# Patient Record
Sex: MALE | Race: WHITE | NOT HISPANIC OR LATINO | Employment: FULL TIME | URBAN - METROPOLITAN AREA
[De-identification: names, ages, dates, MRNs, and addresses within clinical notes are randomized per-mention and may not be internally consistent; named-entity substitution may affect disease eponyms.]

---

## 2018-09-25 ENCOUNTER — OFFICE VISIT (OUTPATIENT)
Dept: INTERNAL MEDICINE CLINIC | Facility: CLINIC | Age: 38
End: 2018-09-25
Payer: COMMERCIAL

## 2018-09-25 VITALS
TEMPERATURE: 98.8 F | HEIGHT: 72 IN | BODY MASS INDEX: 34.21 KG/M2 | DIASTOLIC BLOOD PRESSURE: 90 MMHG | OXYGEN SATURATION: 99 % | HEART RATE: 99 BPM | SYSTOLIC BLOOD PRESSURE: 138 MMHG | WEIGHT: 252.6 LBS

## 2018-09-25 DIAGNOSIS — R03.0 ELEVATED BP WITHOUT DIAGNOSIS OF HYPERTENSION: Primary | ICD-10-CM

## 2018-09-25 DIAGNOSIS — E66.09 CLASS 1 OBESITY DUE TO EXCESS CALORIES WITHOUT SERIOUS COMORBIDITY WITH BODY MASS INDEX (BMI) OF 34.0 TO 34.9 IN ADULT: ICD-10-CM

## 2018-09-25 DIAGNOSIS — Z13.220 LIPID SCREENING: ICD-10-CM

## 2018-09-25 DIAGNOSIS — Z13.29 THYROID DISORDER SCREENING: ICD-10-CM

## 2018-09-25 PROBLEM — E66.811 CLASS 1 OBESITY DUE TO EXCESS CALORIES WITHOUT SERIOUS COMORBIDITY WITH BODY MASS INDEX (BMI) OF 34.0 TO 34.9 IN ADULT: Status: ACTIVE | Noted: 2018-09-25

## 2018-09-25 PROCEDURE — 99214 OFFICE O/P EST MOD 30 MIN: CPT | Performed by: NURSE PRACTITIONER

## 2018-09-25 NOTE — PROGRESS NOTES
Assessment/Plan:     Diagnoses and all orders for this visit:    Elevated BP without diagnosis of hypertension  -     CBC and differential; Future  -     Comprehensive metabolic panel; Future        -     Discussed with patient that if his blood pressure is elevated at the next appointment I would advise that we start him on a low-dose medication  He is agreeable to this plan  I also discussed with him that he needs to improve his diet and his weight which both will help him to lower his blood pressure  Thyroid disorder screening  -     TSH, 3rd generation; Future    Lipid screening  -     Lipid Panel with Direct LDL reflex; Future    Class 1 obesity due to excess calories without serious comorbidity with body mass index (BMI) of 34 0 to 34 9 in adult       I discussed at length with the patient  The need to improve his diet and start exercising  He has had elevated blood pressure readings in the past and elevated again today in the office  He also has a family history of diabetes  He has never had blood work checked in the past and therefore we will order blood work today and have him get this checked and follow up in 4 weeks to review  Advised him the 1st step in improving his diet could be to eliminate sugary drinks and start drinking water  There are significant lifestyle improvements that can be made with him  I educated him on many of them, but realistically expect him to only make a few changes before his next visit  Will follow closely  Subjective:      Patient ID: Tamanna Colorado is a 45 y o  male  HPI    Patient is here today to establish care with our office  He was previously following with Dr Arturo Del Rio [facility]  He has not been seen by an office in a few years  He will be losing his insurance in 1 year and he wanted to be seen  The patient denies any current medical conditions  His chart shows a history of kidney stones, hypertension and anxiety   He does admit to all of these  He states he passed his kidney stone a few years ago  Hypertension - he has been told on many occasions that he has high blood pressure  He has never been started on medication  He states he has never gone to a doctor to be put on medicine  He has a family history of HTN in his mother and father  He also has a family history of diabetes in his father  Anxiety - He reports this is situation but controlled  He states his diet is poor at home  He eats a lot of fast food, lunch meat, cheesesteaks, hot dogs, hamburgers every day throughout the week for lunch  He eats a lot of sweets like tasty cakes, cupcakes, etc  He drinks a large amount of regular soda, ice tea and lemonade  His only exercise is bowling twice a week  He works as an auto-  He has a severe fear of needles  He states he has never given blood in his life  He is due to see the eye doctor      The following portions of the patient's history were reviewed and updated as appropriate: allergies, current medications, past family history, past medical history, past social history, past surgical history and problem list     Review of Systems   Constitutional: Negative for activity change, appetite change, chills, diaphoresis, fatigue, fever and unexpected weight change  Eyes: Negative for pain and visual disturbance  Respiratory: Negative for cough, chest tightness, shortness of breath and wheezing  Cardiovascular: Negative for chest pain, palpitations and leg swelling  Gastrointestinal: Negative for abdominal distention, abdominal pain, blood in stool, constipation, diarrhea, nausea and vomiting  Endocrine: Negative for cold intolerance, heat intolerance, polydipsia, polyphagia and polyuria  Genitourinary: Negative for difficulty urinating and dysuria  Musculoskeletal: Negative for arthralgias and myalgias  Skin: Negative for rash and wound     Neurological: Negative for dizziness, syncope, light-headedness and headaches  Psychiatric/Behavioral: Negative for decreased concentration and sleep disturbance (intermittently)  The patient is nervous/anxious (controlled and situational)  Past Medical History:   Diagnosis Date    Kidney stone     Pneumonia        No current outpatient prescriptions on file  Allergies   Allergen Reactions    Penicillins Rash     Annotation - [id number]: [id number] he has been treated with Amoxicllin for sinusities with no problems  Social History   Past Surgical History:   Procedure Laterality Date    NO PAST SURGERIES      WISDOM TOOTH EXTRACTION      WRIST SURGERY       Family History   Problem Relation Age of Onset    Hypertension Mother     Diabetes Father     Hypertension Father     Rheumatic fever Father     Alcohol abuse Paternal Grandfather     Heart failure Paternal Grandfather     Substance Abuse Paternal Grandfather     Diabetes Family        Objective:  /90 (BP Location: Left arm, Patient Position: Sitting, Cuff Size: Large)   Pulse 99   Temp 98 8 °F (37 1 °C) (Oral)   Ht 6' (1 829 m)   Wt 115 kg (252 lb 9 6 oz)   SpO2 99%   BMI 34 26 kg/m²      Physical Exam   Constitutional: He is oriented to person, place, and time  He appears well-developed and well-nourished  No distress  Obese   HENT:   Head: Normocephalic and atraumatic  Right Ear: Tympanic membrane and external ear normal    Left Ear: Tympanic membrane and external ear normal    Nose: Nose normal    Mouth/Throat: Oropharynx is clear and moist  No oropharyngeal exudate, posterior oropharyngeal edema or posterior oropharyngeal erythema  Eyes: Conjunctivae and EOM are normal  Pupils are equal, round, and reactive to light  Neck: Normal range of motion  Neck supple  No thyromegaly present  Cardiovascular: Normal rate, regular rhythm and normal heart sounds  No murmur heard  Pulmonary/Chest: Effort normal and breath sounds normal  No respiratory distress   He has no decreased breath sounds  He has no wheezes  He has no rhonchi  Abdominal: Soft  Bowel sounds are normal  He exhibits no distension and no mass  There is no tenderness  Musculoskeletal: He exhibits no edema  Lymphadenopathy:     He has no cervical adenopathy  Neurological: He is alert and oriented to person, place, and time  Skin: Skin is warm and dry  He is not diaphoretic  Psychiatric: He has a normal mood and affect  His behavior is normal    Vitals reviewed

## 2018-11-06 ENCOUNTER — OFFICE VISIT (OUTPATIENT)
Dept: INTERNAL MEDICINE CLINIC | Facility: CLINIC | Age: 38
End: 2018-11-06
Payer: COMMERCIAL

## 2018-11-06 VITALS
SYSTOLIC BLOOD PRESSURE: 140 MMHG | HEART RATE: 88 BPM | TEMPERATURE: 98.1 F | BODY MASS INDEX: 33.83 KG/M2 | OXYGEN SATURATION: 99 % | WEIGHT: 249.8 LBS | HEIGHT: 72 IN | DIASTOLIC BLOOD PRESSURE: 98 MMHG

## 2018-11-06 DIAGNOSIS — E66.09 CLASS 1 OBESITY DUE TO EXCESS CALORIES WITHOUT SERIOUS COMORBIDITY WITH BODY MASS INDEX (BMI) OF 34.0 TO 34.9 IN ADULT: ICD-10-CM

## 2018-11-06 DIAGNOSIS — E78.00 HYPERCHOLESTEROLEMIA: ICD-10-CM

## 2018-11-06 DIAGNOSIS — R06.83 SNORING: ICD-10-CM

## 2018-11-06 DIAGNOSIS — I10 ESSENTIAL HYPERTENSION: Primary | ICD-10-CM

## 2018-11-06 PROCEDURE — 1036F TOBACCO NON-USER: CPT | Performed by: NURSE PRACTITIONER

## 2018-11-06 PROCEDURE — 3008F BODY MASS INDEX DOCD: CPT | Performed by: NURSE PRACTITIONER

## 2018-11-06 PROCEDURE — 99214 OFFICE O/P EST MOD 30 MIN: CPT | Performed by: NURSE PRACTITIONER

## 2018-11-06 RX ORDER — LOSARTAN POTASSIUM 50 MG/1
50 TABLET ORAL DAILY
Qty: 30 TABLET | Refills: 1 | Status: SHIPPED | OUTPATIENT
Start: 2018-11-06 | End: 2019-01-16 | Stop reason: SDUPTHER

## 2018-11-06 NOTE — PATIENT INSTRUCTIONS
Start losartan 50mg daily  Goal is less than 130/90  Follow up 1 month  Increase exercise  Improve diet!!!!

## 2018-11-06 NOTE — PROGRESS NOTES
Assessment/Plan:     Diagnoses and all orders for this visit:    Essential hypertension  -     Diagnostic Sleep Study; Future  -     losartan (COZAAR) 50 mg tablet; Take 1 tablet (50 mg total) by mouth daily  -     monitor blood pressure at home  And record in log patient our goal is less than 130/90  Follow-up 1 month    Class 1 obesity due to excess calories without serious comorbidity with body mass index (BMI) of 34 0 to 34 9 in adult  -     Diagnostic Sleep Study; Future  -     goal for at least 5 lb weight loss that one-month follow-up  Reviewed at length again with patient dietary improvements that he needs to make  Hypercholesterolemia        -     reviewed lipid panel with patient    Advised on dietary changes to improve this  Snoring  -     Diagnostic Sleep Study; Future  -     with patient's large neck circumference, crowded airway, obesity, and now hypertension will send for sleep study for further evaluation of obstructive sleep apnea              Subjective:      Patient ID: Isaiah Carroll is a 45 y o  male  HPI     Patient presents today for 1 month follow up and to review labs    Hypertension  Patient is here for follow-up of elevated blood pressure  He is not exercising but does bowl twice a week and is not adherent to a low-salt diet  He does notchecks his blood pressure at home  Blood pressure is not well controlled  Current cardiac symptoms: none  Patient denies chest pain, chest pressure/discomfort, dyspnea, exertional chest pressure/discomfort, fatigue, irregular heart beat, lower extremity edema, near-syncope, palpitations and syncope  Cardiovascular risk factors: dyslipidemia, hypertension, male gender, obesity (BMI >= 30 kg/m2) and sedentary lifestyle  History of target organ damage: none  He is currently not taking any medications  Hyperlipidemia  Isaiah Carroll is a 45 y o  male who presents for follow up of dyslipidemia   A repeat fasting lipid profile was done  Total cholesterol 219 ; Triglycerides 84 ; HDL 40 ;    Patient is not currently on any cholesterol lowering medication  No labs for comparison  Snoring  Patient's wife reports that he snores often  No witnessed apneas  TSH normal 2 87  Glucose normal 89    The following portions of the patient's history were reviewed and updated as appropriate: allergies, current medications, past family history, past medical history, past social history, past surgical history and problem list     Review of Systems   Constitutional: Negative for chills, fever and unexpected weight change  Eyes: Negative for visual disturbance  Respiratory: Negative for cough, chest tightness, shortness of breath and wheezing  Cardiovascular: Negative for chest pain and palpitations  Neurological: Negative for dizziness, syncope, light-headedness and headaches  Past Medical History:   Diagnosis Date    Class 1 obesity due to excess calories without serious comorbidity with body mass index (BMI) of 34 0 to 34 9 in adult 9/25/2018    Kidney stone     Pneumonia        No current outpatient prescriptions on file  Allergies   Allergen Reactions    Penicillins Rash     Annotation - [id number]: [id number] he has been treated with Amoxicllin for sinusities with no problems         Social History   Past Surgical History:   Procedure Laterality Date    NO PAST SURGERIES      WISDOM TOOTH EXTRACTION      WRIST SURGERY       Family History   Problem Relation Age of Onset    Hypertension Mother     Diabetes Father     Hypertension Father     Rheumatic fever Father     Alcohol abuse Paternal Grandfather     Heart failure Paternal Grandfather     Substance Abuse Paternal Grandfather     Diabetes Family        Objective:  /98 (BP Location: Left arm, Patient Position: Sitting, Cuff Size: Large)   Pulse 88   Temp 98 1 °F (36 7 °C) (Oral)   Ht 6' (1 829 m)   Wt 113 kg (249 lb 12 8 oz)   SpO2 99%   BMI 33 88 kg/m²      Physical Exam   Constitutional: He is oriented to person, place, and time  He appears well-developed and well-nourished  No distress  Obese   HENT:   Head: Normocephalic and atraumatic  Right Ear: External ear normal    Left Ear: External ear normal    Nose: Nose normal    Mouth/Throat: Oropharynx is clear and moist and mucous membranes are normal  No oropharyngeal exudate, posterior oropharyngeal edema or posterior oropharyngeal erythema  Crowded airway   Eyes: Pupils are equal, round, and reactive to light  Conjunctivae and EOM are normal    Neck: Normal range of motion  Neck supple  Carotid bruit is not present  No thyromegaly present  Large neck circumference  18 25 inches   Cardiovascular: Normal rate, regular rhythm and normal heart sounds  No murmur heard  Pulmonary/Chest: Effort normal and breath sounds normal  No respiratory distress  He has no decreased breath sounds  He has no wheezes  He has no rhonchi  Musculoskeletal: He exhibits no edema  Lymphadenopathy:     He has no cervical adenopathy  Neurological: He is alert and oriented to person, place, and time  Skin: Skin is warm and dry  He is not diaphoretic  Psychiatric: He has a normal mood and affect  His behavior is normal    Vitals reviewed

## 2018-12-11 ENCOUNTER — OFFICE VISIT (OUTPATIENT)
Dept: INTERNAL MEDICINE CLINIC | Facility: CLINIC | Age: 38
End: 2018-12-11
Payer: COMMERCIAL

## 2018-12-11 VITALS
DIASTOLIC BLOOD PRESSURE: 74 MMHG | HEIGHT: 73 IN | TEMPERATURE: 98.6 F | WEIGHT: 251 LBS | SYSTOLIC BLOOD PRESSURE: 128 MMHG | HEART RATE: 86 BPM | BODY MASS INDEX: 33.27 KG/M2 | OXYGEN SATURATION: 98 %

## 2018-12-11 DIAGNOSIS — E78.00 HYPERCHOLESTEROLEMIA: ICD-10-CM

## 2018-12-11 DIAGNOSIS — E66.09 CLASS 1 OBESITY DUE TO EXCESS CALORIES WITHOUT SERIOUS COMORBIDITY WITH BODY MASS INDEX (BMI) OF 34.0 TO 34.9 IN ADULT: ICD-10-CM

## 2018-12-11 DIAGNOSIS — I10 ESSENTIAL HYPERTENSION: Primary | ICD-10-CM

## 2018-12-11 DIAGNOSIS — R06.83 SNORING: ICD-10-CM

## 2018-12-11 PROBLEM — R03.0 ELEVATED BP WITHOUT DIAGNOSIS OF HYPERTENSION: Status: RESOLVED | Noted: 2018-09-25 | Resolved: 2018-12-11

## 2018-12-11 PROBLEM — Z13.220 LIPID SCREENING: Status: RESOLVED | Noted: 2018-09-25 | Resolved: 2018-12-11

## 2018-12-11 PROBLEM — Z13.29 THYROID DISORDER SCREENING: Status: RESOLVED | Noted: 2018-09-25 | Resolved: 2018-12-11

## 2018-12-11 PROCEDURE — 3008F BODY MASS INDEX DOCD: CPT | Performed by: NURSE PRACTITIONER

## 2018-12-11 PROCEDURE — 99213 OFFICE O/P EST LOW 20 MIN: CPT | Performed by: NURSE PRACTITIONER

## 2018-12-11 PROCEDURE — 1036F TOBACCO NON-USER: CPT | Performed by: NURSE PRACTITIONER

## 2018-12-11 PROCEDURE — 3078F DIAST BP <80 MM HG: CPT | Performed by: NURSE PRACTITIONER

## 2018-12-11 PROCEDURE — 3074F SYST BP LT 130 MM HG: CPT | Performed by: NURSE PRACTITIONER

## 2018-12-11 NOTE — PATIENT INSTRUCTIONS
Continue same medication  Continue to monitor BP at home  Improve diet  Start exercise  Aim for weight loss  Follow up in 3 months with labs before visit     Schedule sleep study

## 2018-12-11 NOTE — PROGRESS NOTES
Assessment/Plan:     Diagnoses and all orders for this visit:    Essential hypertension       -    blood pressure much improved on losartan 50 mg daily  Now at goal   Continue same medication and continue to monitor blood pressure at home  Follow-up in 3 months    Hypercholesterolemia  -     Lipid Panel with Direct LDL reflex; Future  -     again reviewed dietary changes with the patient  Advised him to follow up with new labs in 3 months to monitor cholesterol    Class 1 obesity due to excess calories without serious comorbidity with body mass index (BMI) of 34 0 to 34 9 in adult         -    reviewed importance of starting an exercise regimen  Also discussed dietary improvement    Snoring              -  advised patient to schedule sleep study    Follow-up 3 months        Subjective:      Patient ID: Tracy Cunningham is a 45 y o  male  HPI    Patient presents for 1 month follow up hypertension  He was started on losartan 50mg at the last visit  Hypertension  Patient is here for follow-up of elevated blood pressure  He is not exercising and is not adherent to a low-salt diet  He has been cutting back on soda  He does notchecks his blood pressure at home routinely  He has checked about twice and got 130/80  Blood pressure is well controlled  Current cardiac symptoms: none  Patient denies chest pain, chest pressure/discomfort, dyspnea, exertional chest pressure/discomfort, fatigue, irregular heart beat, lower extremity edema and palpitations  He is currently taking losartan 50mg daily He is compliant with medication use  Forgot about 3x in the last month  Patient has not gone for sleep study yet  He has cut back on soda  No exercise    Obesity  Patient has tried to cut back on soda  Otherwise he has not made any significant dietary changes  He also has not started an exercise regimen  He has gained 2 lb since last visit         The following portions of the patient's history were reviewed and updated as appropriate: allergies, current medications, past family history, past medical history, past social history, past surgical history and problem list     Review of Systems   Constitutional: Negative for chills, fatigue and fever  Eyes: Negative for visual disturbance  Respiratory: Negative for cough, chest tightness, shortness of breath and wheezing  Cardiovascular: Negative for chest pain, palpitations and leg swelling  Neurological: Positive for headaches  Negative for dizziness and light-headedness (occasional)  Psychiatric/Behavioral: Negative for sleep disturbance  Past Medical History:   Diagnosis Date    Class 1 obesity due to excess calories without serious comorbidity with body mass index (BMI) of 34 0 to 34 9 in adult 9/25/2018    Essential hypertension 11/6/2018    Kidney stone     Pneumonia          Current Outpatient Prescriptions:     losartan (COZAAR) 50 mg tablet, Take 1 tablet (50 mg total) by mouth daily, Disp: 30 tablet, Rfl: 1    Allergies   Allergen Reactions    Penicillins Rash     Annotation - [id number]: HOwever he has been treated with Amoxicllin for sinusities with no problems  Social History   Past Surgical History:   Procedure Laterality Date    NO PAST SURGERIES      WISDOM TOOTH EXTRACTION      WRIST SURGERY       Family History   Problem Relation Age of Onset    Hypertension Mother     Diabetes Father     Hypertension Father     Rheumatic fever Father     Alcohol abuse Paternal Grandfather     Heart failure Paternal Grandfather     Substance Abuse Paternal Grandfather     Diabetes Family        Objective:  /74 (BP Location: Left arm, Patient Position: Sitting, Cuff Size: Large)   Pulse 86   Temp 98 6 °F (37 °C) (Oral)   Ht 6' 0 5" (1 842 m)   Wt 114 kg (251 lb)   SpO2 98%   BMI 33 57 kg/m²      Physical Exam   Constitutional: He appears well-developed and well-nourished  No distress     HENT:   Head: Normocephalic and atraumatic  Eyes: Pupils are equal, round, and reactive to light  Conjunctivae and EOM are normal    Neck: Normal range of motion  Neck supple  Carotid bruit is not present  No thyromegaly present  Cardiovascular: Normal rate, regular rhythm and normal heart sounds  No murmur heard  Pulmonary/Chest: Effort normal and breath sounds normal  No respiratory distress  He has no wheezes  Musculoskeletal: He exhibits no edema  Lymphadenopathy:     He has no cervical adenopathy  Neurological: He is alert  Skin: Skin is warm and dry  He is not diaphoretic  Psychiatric: He has a normal mood and affect  His behavior is normal    Vitals reviewed

## 2019-01-16 DIAGNOSIS — I10 ESSENTIAL HYPERTENSION: ICD-10-CM

## 2019-01-16 RX ORDER — LOSARTAN POTASSIUM 50 MG/1
50 TABLET ORAL DAILY
Qty: 90 TABLET | Refills: 1 | Status: SHIPPED | OUTPATIENT
Start: 2019-01-16 | End: 2019-08-23 | Stop reason: SDUPTHER

## 2019-03-09 ENCOUNTER — APPOINTMENT (OUTPATIENT)
Dept: LAB | Age: 39
End: 2019-03-09
Payer: COMMERCIAL

## 2019-03-09 DIAGNOSIS — E78.00 HYPERCHOLESTEROLEMIA: ICD-10-CM

## 2019-03-09 LAB
CHOLEST SERPL-MCNC: 239 MG/DL (ref 50–200)
HDLC SERPL-MCNC: 36 MG/DL (ref 40–60)
LDLC SERPL CALC-MCNC: 182 MG/DL (ref 0–100)
TRIGL SERPL-MCNC: 105 MG/DL

## 2019-03-09 PROCEDURE — 80061 LIPID PANEL: CPT

## 2019-03-09 PROCEDURE — 36415 COLL VENOUS BLD VENIPUNCTURE: CPT

## 2019-03-14 ENCOUNTER — OFFICE VISIT (OUTPATIENT)
Dept: INTERNAL MEDICINE CLINIC | Facility: CLINIC | Age: 39
End: 2019-03-14
Payer: COMMERCIAL

## 2019-03-14 VITALS
SYSTOLIC BLOOD PRESSURE: 120 MMHG | DIASTOLIC BLOOD PRESSURE: 90 MMHG | OXYGEN SATURATION: 98 % | HEART RATE: 93 BPM | HEIGHT: 73 IN | BODY MASS INDEX: 33.5 KG/M2 | TEMPERATURE: 98.6 F | WEIGHT: 252.8 LBS

## 2019-03-14 DIAGNOSIS — R06.83 SNORING: ICD-10-CM

## 2019-03-14 DIAGNOSIS — E66.09 CLASS 1 OBESITY DUE TO EXCESS CALORIES WITHOUT SERIOUS COMORBIDITY WITH BODY MASS INDEX (BMI) OF 34.0 TO 34.9 IN ADULT: ICD-10-CM

## 2019-03-14 DIAGNOSIS — I10 ESSENTIAL HYPERTENSION: ICD-10-CM

## 2019-03-14 DIAGNOSIS — E78.00 HYPERCHOLESTEROLEMIA: ICD-10-CM

## 2019-03-14 PROCEDURE — 99214 OFFICE O/P EST MOD 30 MIN: CPT | Performed by: NURSE PRACTITIONER

## 2019-03-14 PROCEDURE — 1036F TOBACCO NON-USER: CPT | Performed by: NURSE PRACTITIONER

## 2019-03-14 PROCEDURE — 3008F BODY MASS INDEX DOCD: CPT | Performed by: NURSE PRACTITIONER

## 2019-03-14 RX ORDER — SIMVASTATIN 10 MG
10 TABLET ORAL
Qty: 30 TABLET | Refills: 5 | Status: SHIPPED | OUTPATIENT
Start: 2019-03-14 | End: 2020-02-24 | Stop reason: SDUPTHER

## 2019-03-14 NOTE — PROGRESS NOTES
Assessment/Plan:  BMI Counseling: Body mass index is 33 81 kg/m²  Discussed the patient's BMI with him  The BMI is above average  BMI counseling and education was provided to the patient  Nutrition recommendations include reducing portion sizes, decreasing overall calorie intake and reducing intake of cholesterol  Exercise recommendations include moderate aerobic physical activity for 150 minutes/week  Problem List Items Addressed This Visit        Cardiovascular and Mediastinum    Essential hypertension     Continue losartan 50 mg daily            Other    Class 1 obesity due to excess calories without serious comorbidity with body mass index (BMI) of 34 0 to 34 9 in adult    Hypercholesterolemia     Total cholesterol above goal at 239,   Discussed options with patient of starting low-dose statin verses implementing lifestyle improvements  Patient verbalized that he you will not change his lifestyle and would rather start a medication  Will start him on simvastatin 10 mg daily  Follow-up in 3 months         Relevant Medications    simvastatin (ZOCOR) 10 mg tablet    Other Relevant Orders    Lipid Panel with Direct LDL reflex    Comprehensive metabolic panel    CK (with reflex to MB)    Snoring          Patient still has order for sleep study which he does plan to get done eventually    Subjective:      Patient ID: Merlinda Rock is a 44 y o  male  HPI     Patient presents for 3 month follow up HTN, HLD, and obesity  Hypertension  Patient is here for follow-up of elevated blood pressure  He is not exercising but he does go bowling and is adherent to a low-salt diet  He does checks his blood pressure at home  Average 118/ mid 80s  Blood pressure is well controlled  Current cardiac symptoms: none  Patient denies chest pain, chest pressure/discomfort, dyspnea, exertional chest pressure/discomfort, irregular heart beat, lower extremity edema and palpitations   Cardiovascular risk factors: dyslipidemia, hypertension and obesity (BMI >= 30 kg/m2)  History of target organ damage: none  He is currently taking angiotensin II receptor antagonist, losartan 50mg  He is compliant with medication use  Hyperlipidemia  Bryn Houston is a 44 y o  male who presents for follow up of dyslipidemia  A repeat fasting lipid profile was done  Total cholesterol 239 ; Triglycerides 105 ; HDL 36 ;    Previous history of cardiac disease includes: none  Lipid abnormalities are worsening  The patient exercises infrequently  Ten year clinical ASCVD risk score 2 7%    Snoring  He has not gone for his sleep study  He is having trouble finding time to go for it  Obesity  Slowly gaining weight  He is not exercising - he does go bowling  He is aware that his diet could be improved    The following portions of the patient's history were reviewed and updated as appropriate: allergies, current medications, past family history, past medical history, past social history, past surgical history and problem list     Review of Systems   Constitutional: Negative for activity change, appetite change, chills, diaphoresis, fatigue, fever and unexpected weight change  Eyes: Negative for visual disturbance  Respiratory: Negative for cough, chest tightness, shortness of breath and wheezing  Cardiovascular: Negative for chest pain, palpitations and leg swelling  Gastrointestinal: Negative for abdominal distention, abdominal pain, diarrhea, nausea and vomiting  Neurological: Negative for dizziness, light-headedness and headaches  Psychiatric/Behavioral: Negative for sleep disturbance           Past Medical History:   Diagnosis Date    Class 1 obesity due to excess calories without serious comorbidity with body mass index (BMI) of 34 0 to 34 9 in adult 9/25/2018    Essential hypertension 11/6/2018    Kidney stone     Pneumonia          Current Outpatient Medications:     losartan (COZAAR) 50 mg tablet, Take 1 tablet (50 mg total) by mouth daily, Disp: 90 tablet, Rfl: 1    Allergies   Allergen Reactions    Penicillins Rash     Annotation - [id number]: HOwever he has been treated with Amoxicllin for sinusities with no problems  Social History   Past Surgical History:   Procedure Laterality Date    NO PAST SURGERIES      WISDOM TOOTH EXTRACTION      WRIST SURGERY       Family History   Problem Relation Age of Onset    Hypertension Mother     Diabetes Father     Hypertension Father     Rheumatic fever Father     Alcohol abuse Paternal Grandfather     Heart failure Paternal Grandfather     Substance Abuse Paternal Grandfather     Diabetes Family        Objective:  /90 (BP Location: Left arm, Patient Position: Sitting, Cuff Size: Standard)   Pulse 93   Temp 98 6 °F (37 °C) (Oral)   Ht 6' 0 5" (1 842 m)   Wt 115 kg (252 lb 12 8 oz)   SpO2 98%   BMI 33 81 kg/m²      Physical Exam   Constitutional: He is oriented to person, place, and time  He appears well-developed and well-nourished  No distress  Obese   HENT:   Head: Normocephalic and atraumatic  Right Ear: Tympanic membrane and external ear normal    Left Ear: Tympanic membrane and external ear normal    Nose: Nose normal    Mouth/Throat: Oropharynx is clear and moist  No oropharyngeal exudate, posterior oropharyngeal edema or posterior oropharyngeal erythema  Eyes: Pupils are equal, round, and reactive to light  Conjunctivae and EOM are normal    Neck: Normal range of motion  Neck supple  Carotid bruit is not present  Cardiovascular: Normal rate, regular rhythm and normal heart sounds  No murmur heard  Pulmonary/Chest: Effort normal and breath sounds normal  No respiratory distress  He has no decreased breath sounds  He has no wheezes  He has no rhonchi  Musculoskeletal: He exhibits no edema  Lymphadenopathy:     He has no cervical adenopathy  Neurological: He is alert and oriented to person, place, and time     Skin: Skin is warm and dry  He is not diaphoretic  Psychiatric: He has a normal mood and affect  His behavior is normal    Vitals reviewed

## 2019-03-14 NOTE — PATIENT INSTRUCTIONS
Start simvastatin at night before bed  Continue losartan   Go for labs about 1 week before your next visit in 3-4 months  Improve exercise - 30 min 5x per week  Work on healthy diet - low fat, more fruits, vegetables, whole grains, work on reducing portion sizes

## 2019-03-14 NOTE — ASSESSMENT & PLAN NOTE
Total cholesterol above goal at 239,   Discussed options with patient of starting low-dose statin verses implementing lifestyle improvements  Patient verbalized that he you will not change his lifestyle and would rather start a medication  Will start him on simvastatin 10 mg daily    Follow-up in 3 months

## 2019-05-28 ENCOUNTER — OFFICE VISIT (OUTPATIENT)
Dept: INTERNAL MEDICINE CLINIC | Age: 39
End: 2019-05-28
Payer: COMMERCIAL

## 2019-05-28 VITALS
BODY MASS INDEX: 34.86 KG/M2 | DIASTOLIC BLOOD PRESSURE: 82 MMHG | HEART RATE: 125 BPM | SYSTOLIC BLOOD PRESSURE: 158 MMHG | OXYGEN SATURATION: 95 % | WEIGHT: 260.6 LBS | TEMPERATURE: 101.1 F

## 2019-05-28 DIAGNOSIS — R31.9 HEMATURIA, UNSPECIFIED TYPE: Primary | ICD-10-CM

## 2019-05-28 PROBLEM — R31.0 GROSS HEMATURIA: Status: ACTIVE | Noted: 2019-05-28

## 2019-05-28 LAB
SL AMB  POCT GLUCOSE, UA: NEGATIVE
SL AMB LEUKOCYTE ESTERASE,UA: ABNORMAL
SL AMB POCT BILIRUBIN,UA: NEGATIVE
SL AMB POCT BLOOD,UA: ABNORMAL
SL AMB POCT CLARITY,UA: ABNORMAL
SL AMB POCT COLOR,UA: YELLOW
SL AMB POCT KETONES,UA: NEGATIVE
SL AMB POCT NITRITE,UA: NEGATIVE
SL AMB POCT PH,UA: 6
SL AMB POCT SPECIFIC GRAVITY,UA: 1
SL AMB POCT URINE PROTEIN: ABNORMAL
SL AMB POCT UROBILINOGEN: 0.2

## 2019-05-28 PROCEDURE — 81003 URINALYSIS AUTO W/O SCOPE: CPT | Performed by: INTERNAL MEDICINE

## 2019-05-28 PROCEDURE — 99214 OFFICE O/P EST MOD 30 MIN: CPT | Performed by: INTERNAL MEDICINE

## 2019-05-28 RX ORDER — CIPROFLOXACIN 500 MG/1
500 TABLET, FILM COATED ORAL EVERY 12 HOURS SCHEDULED
Qty: 16 TABLET | Refills: 0 | Status: SHIPPED | OUTPATIENT
Start: 2019-05-28 | End: 2019-06-05

## 2019-05-29 ENCOUNTER — HOSPITAL ENCOUNTER (OUTPATIENT)
Dept: RADIOLOGY | Age: 39
Discharge: HOME | End: 2019-05-29
Payer: COMMERCIAL

## 2019-05-29 ENCOUNTER — APPOINTMENT (OUTPATIENT)
Dept: LAB | Age: 39
End: 2019-05-29
Payer: COMMERCIAL

## 2019-05-29 DIAGNOSIS — R31.9 HEMATURIA, UNSPECIFIED TYPE: ICD-10-CM

## 2019-05-29 LAB
ANION GAP SERPL CALCULATED.3IONS-SCNC: 6 MMOL/L (ref 4–13)
BACTERIA UR QL AUTO: ABNORMAL /HPF
BASOPHILS # BLD AUTO: 0.05 THOUSANDS/ΜL (ref 0–0.1)
BASOPHILS NFR BLD AUTO: 1 % (ref 0–1)
BILIRUB UR QL STRIP: NEGATIVE
BUN SERPL-MCNC: 9 MG/DL (ref 5–25)
CALCIUM SERPL-MCNC: 8.6 MG/DL (ref 8.3–10.1)
CHLORIDE SERPL-SCNC: 104 MMOL/L (ref 100–108)
CLARITY UR: ABNORMAL
CO2 SERPL-SCNC: 29 MMOL/L (ref 21–32)
COLOR UR: YELLOW
CREAT SERPL-MCNC: 0.88 MG/DL (ref 0.6–1.3)
EOSINOPHIL # BLD AUTO: 0.16 THOUSAND/ΜL (ref 0–0.61)
EOSINOPHIL NFR BLD AUTO: 2 % (ref 0–6)
ERYTHROCYTE [DISTWIDTH] IN BLOOD BY AUTOMATED COUNT: 13.7 % (ref 11.6–15.1)
GFR SERPL CREATININE-BSD FRML MDRD: 108 ML/MIN/1.73SQ M
GLUCOSE P FAST SERPL-MCNC: 73 MG/DL (ref 65–99)
GLUCOSE UR STRIP-MCNC: NEGATIVE MG/DL
HCT VFR BLD AUTO: 46.2 % (ref 36.5–49.3)
HGB BLD-MCNC: 15.4 G/DL (ref 12–17)
HGB UR QL STRIP.AUTO: ABNORMAL
HYALINE CASTS #/AREA URNS LPF: ABNORMAL /LPF
IMM GRANULOCYTES # BLD AUTO: 0.03 THOUSAND/UL (ref 0–0.2)
IMM GRANULOCYTES NFR BLD AUTO: 0 % (ref 0–2)
KETONES UR STRIP-MCNC: NEGATIVE MG/DL
LEUKOCYTE ESTERASE UR QL STRIP: ABNORMAL
LYMPHOCYTES # BLD AUTO: 2.11 THOUSANDS/ΜL (ref 0.6–4.47)
LYMPHOCYTES NFR BLD AUTO: 31 % (ref 14–44)
MCH RBC QN AUTO: 29.1 PG (ref 26.8–34.3)
MCHC RBC AUTO-ENTMCNC: 33.3 G/DL (ref 31.4–37.4)
MCV RBC AUTO: 87 FL (ref 82–98)
MONOCYTES # BLD AUTO: 0.63 THOUSAND/ΜL (ref 0.17–1.22)
MONOCYTES NFR BLD AUTO: 9 % (ref 4–12)
NEUTROPHILS # BLD AUTO: 3.9 THOUSANDS/ΜL (ref 1.85–7.62)
NEUTS SEG NFR BLD AUTO: 57 % (ref 43–75)
NITRITE UR QL STRIP: NEGATIVE
NON-SQ EPI CELLS URNS QL MICRO: ABNORMAL /HPF
NRBC BLD AUTO-RTO: 0 /100 WBCS
PH UR STRIP.AUTO: 6.5 [PH]
PLATELET # BLD AUTO: 295 THOUSANDS/UL (ref 149–390)
PMV BLD AUTO: 10.1 FL (ref 8.9–12.7)
POTASSIUM SERPL-SCNC: 4.2 MMOL/L (ref 3.5–5.3)
PROT UR STRIP-MCNC: ABNORMAL MG/DL
RBC # BLD AUTO: 5.3 MILLION/UL (ref 3.88–5.62)
RBC #/AREA URNS AUTO: ABNORMAL /HPF
SODIUM SERPL-SCNC: 139 MMOL/L (ref 136–145)
SP GR UR STRIP.AUTO: 1 (ref 1–1.03)
UROBILINOGEN UR QL STRIP.AUTO: 0.2 E.U./DL
WBC # BLD AUTO: 6.88 THOUSAND/UL (ref 4.31–10.16)
WBC #/AREA URNS AUTO: ABNORMAL /HPF

## 2019-05-29 PROCEDURE — 36415 COLL VENOUS BLD VENIPUNCTURE: CPT

## 2019-05-29 PROCEDURE — 76770 US EXAM ABDO BACK WALL COMP: CPT

## 2019-05-29 PROCEDURE — 87086 URINE CULTURE/COLONY COUNT: CPT | Performed by: INTERNAL MEDICINE

## 2019-05-29 PROCEDURE — 87077 CULTURE AEROBIC IDENTIFY: CPT | Performed by: INTERNAL MEDICINE

## 2019-05-29 PROCEDURE — 81001 URINALYSIS AUTO W/SCOPE: CPT | Performed by: INTERNAL MEDICINE

## 2019-05-29 PROCEDURE — 80048 BASIC METABOLIC PNL TOTAL CA: CPT

## 2019-05-29 PROCEDURE — 85025 COMPLETE CBC W/AUTO DIFF WBC: CPT

## 2019-05-29 PROCEDURE — 87186 SC STD MICRODIL/AGAR DIL: CPT | Performed by: INTERNAL MEDICINE

## 2019-05-30 ENCOUNTER — OFFICE VISIT (OUTPATIENT)
Dept: INTERNAL MEDICINE CLINIC | Age: 39
End: 2019-05-30
Payer: COMMERCIAL

## 2019-05-30 VITALS
WEIGHT: 258.2 LBS | TEMPERATURE: 97.6 F | SYSTOLIC BLOOD PRESSURE: 136 MMHG | DIASTOLIC BLOOD PRESSURE: 98 MMHG | BODY MASS INDEX: 34.54 KG/M2 | OXYGEN SATURATION: 96 % | HEART RATE: 90 BPM

## 2019-05-30 DIAGNOSIS — R31.9 HEMATURIA, UNSPECIFIED TYPE: ICD-10-CM

## 2019-05-30 DIAGNOSIS — N20.0 KIDNEY STONE: Primary | ICD-10-CM

## 2019-05-30 PROBLEM — R31.0 GROSS HEMATURIA: Status: RESOLVED | Noted: 2019-05-28 | Resolved: 2019-05-30

## 2019-05-30 PROCEDURE — 99213 OFFICE O/P EST LOW 20 MIN: CPT | Performed by: INTERNAL MEDICINE

## 2019-05-31 LAB — BACTERIA UR CULT: ABNORMAL

## 2019-06-08 ENCOUNTER — APPOINTMENT (OUTPATIENT)
Dept: LAB | Age: 39
End: 2019-06-08
Payer: COMMERCIAL

## 2019-06-08 DIAGNOSIS — N20.0 KIDNEY STONE: ICD-10-CM

## 2019-06-08 DIAGNOSIS — R31.9 HEMATURIA, UNSPECIFIED TYPE: ICD-10-CM

## 2019-06-08 DIAGNOSIS — E78.00 HYPERCHOLESTEROLEMIA: ICD-10-CM

## 2019-06-08 LAB
ALBUMIN SERPL BCP-MCNC: 4.2 G/DL (ref 3.5–5)
ALP SERPL-CCNC: 87 U/L (ref 46–116)
ALT SERPL W P-5'-P-CCNC: 48 U/L (ref 12–78)
ANION GAP SERPL CALCULATED.3IONS-SCNC: 3 MMOL/L (ref 4–13)
AST SERPL W P-5'-P-CCNC: 14 U/L (ref 5–45)
BILIRUB SERPL-MCNC: 0.29 MG/DL (ref 0.2–1)
BUN SERPL-MCNC: 11 MG/DL (ref 5–25)
CALCIUM SERPL-MCNC: 8.5 MG/DL (ref 8.3–10.1)
CHLORIDE SERPL-SCNC: 108 MMOL/L (ref 100–108)
CHOLEST SERPL-MCNC: 145 MG/DL (ref 50–200)
CK SERPL-CCNC: 91 U/L (ref 39–308)
CO2 SERPL-SCNC: 27 MMOL/L (ref 21–32)
CREAT SERPL-MCNC: 0.89 MG/DL (ref 0.6–1.3)
GFR SERPL CREATININE-BSD FRML MDRD: 108 ML/MIN/1.73SQ M
GLUCOSE P FAST SERPL-MCNC: 94 MG/DL (ref 65–99)
HDLC SERPL-MCNC: 31 MG/DL (ref 40–60)
LDLC SERPL CALC-MCNC: 102 MG/DL (ref 0–100)
POTASSIUM SERPL-SCNC: 4.4 MMOL/L (ref 3.5–5.3)
PROT SERPL-MCNC: 7.6 G/DL (ref 6.4–8.2)
PSA SERPL-MCNC: 1.3 NG/ML (ref 0–4)
SODIUM SERPL-SCNC: 138 MMOL/L (ref 136–145)
TRIGL SERPL-MCNC: 60 MG/DL

## 2019-06-08 PROCEDURE — 80061 LIPID PANEL: CPT

## 2019-06-08 PROCEDURE — G0103 PSA SCREENING: HCPCS

## 2019-06-08 PROCEDURE — 36415 COLL VENOUS BLD VENIPUNCTURE: CPT

## 2019-06-08 PROCEDURE — 80053 COMPREHEN METABOLIC PANEL: CPT

## 2019-06-08 PROCEDURE — 82550 ASSAY OF CK (CPK): CPT

## 2019-06-14 ENCOUNTER — OFFICE VISIT (OUTPATIENT)
Dept: INTERNAL MEDICINE CLINIC | Facility: CLINIC | Age: 39
End: 2019-06-14
Payer: COMMERCIAL

## 2019-06-14 VITALS
TEMPERATURE: 98.2 F | HEIGHT: 73 IN | WEIGHT: 254.6 LBS | SYSTOLIC BLOOD PRESSURE: 122 MMHG | DIASTOLIC BLOOD PRESSURE: 88 MMHG | OXYGEN SATURATION: 97 % | BODY MASS INDEX: 33.74 KG/M2 | HEART RATE: 92 BPM

## 2019-06-14 DIAGNOSIS — I10 ESSENTIAL HYPERTENSION: ICD-10-CM

## 2019-06-14 DIAGNOSIS — E66.09 CLASS 1 OBESITY DUE TO EXCESS CALORIES WITHOUT SERIOUS COMORBIDITY WITH BODY MASS INDEX (BMI) OF 34.0 TO 34.9 IN ADULT: ICD-10-CM

## 2019-06-14 DIAGNOSIS — R06.83 SNORING: ICD-10-CM

## 2019-06-14 DIAGNOSIS — E78.00 HYPERCHOLESTEROLEMIA: Primary | ICD-10-CM

## 2019-06-14 PROCEDURE — 3008F BODY MASS INDEX DOCD: CPT | Performed by: NURSE PRACTITIONER

## 2019-06-14 PROCEDURE — 1036F TOBACCO NON-USER: CPT | Performed by: NURSE PRACTITIONER

## 2019-06-14 PROCEDURE — 3079F DIAST BP 80-89 MM HG: CPT | Performed by: NURSE PRACTITIONER

## 2019-06-14 PROCEDURE — 99214 OFFICE O/P EST MOD 30 MIN: CPT | Performed by: NURSE PRACTITIONER

## 2019-06-14 PROCEDURE — 3074F SYST BP LT 130 MM HG: CPT | Performed by: NURSE PRACTITIONER

## 2019-08-23 DIAGNOSIS — I10 ESSENTIAL HYPERTENSION: ICD-10-CM

## 2019-08-23 RX ORDER — LOSARTAN POTASSIUM 50 MG/1
50 TABLET ORAL DAILY
Qty: 90 TABLET | Refills: 0 | Status: SHIPPED | OUTPATIENT
Start: 2019-08-23 | End: 2019-10-16

## 2019-10-16 ENCOUNTER — OFFICE VISIT (OUTPATIENT)
Dept: INTERNAL MEDICINE CLINIC | Facility: CLINIC | Age: 39
End: 2019-10-16
Payer: COMMERCIAL

## 2019-10-16 VITALS
BODY MASS INDEX: 35 KG/M2 | SYSTOLIC BLOOD PRESSURE: 128 MMHG | HEART RATE: 84 BPM | WEIGHT: 258.4 LBS | TEMPERATURE: 98.2 F | HEIGHT: 72 IN | DIASTOLIC BLOOD PRESSURE: 92 MMHG

## 2019-10-16 DIAGNOSIS — R06.83 SNORING: ICD-10-CM

## 2019-10-16 DIAGNOSIS — I10 ESSENTIAL HYPERTENSION: Primary | ICD-10-CM

## 2019-10-16 DIAGNOSIS — E78.00 HYPERCHOLESTEROLEMIA: ICD-10-CM

## 2019-10-16 DIAGNOSIS — E66.01 CLASS 2 SEVERE OBESITY DUE TO EXCESS CALORIES WITH SERIOUS COMORBIDITY AND BODY MASS INDEX (BMI) OF 35.0 TO 35.9 IN ADULT (HCC): ICD-10-CM

## 2019-10-16 PROBLEM — E66.812 CLASS 2 SEVERE OBESITY DUE TO EXCESS CALORIES WITH SERIOUS COMORBIDITY AND BODY MASS INDEX (BMI) OF 35.0 TO 35.9 IN ADULT (HCC): Status: ACTIVE | Noted: 2018-09-25

## 2019-10-16 PROCEDURE — 3008F BODY MASS INDEX DOCD: CPT | Performed by: NURSE PRACTITIONER

## 2019-10-16 PROCEDURE — 99214 OFFICE O/P EST MOD 30 MIN: CPT | Performed by: NURSE PRACTITIONER

## 2019-10-16 RX ORDER — LOSARTAN POTASSIUM AND HYDROCHLOROTHIAZIDE 12.5; 5 MG/1; MG/1
1 TABLET ORAL DAILY
Qty: 30 TABLET | Refills: 5 | Status: SHIPPED | OUTPATIENT
Start: 2019-10-16 | End: 2020-03-19

## 2019-10-16 NOTE — ASSESSMENT & PLAN NOTE
Diastolic remains above goal  Will add HCTZ 12 5   Start losartan-hctz 50-12 5mg daily   Continue to monitor BP at home

## 2019-10-16 NOTE — PROGRESS NOTES
Assessment/Plan:    Problem List Items Addressed This Visit        Cardiovascular and Mediastinum    Essential hypertension - Primary     Diastolic remains above goal  Will add HCTZ 12 5   Start losartan-hctz 50-12 5mg daily   Continue to monitor BP at home         Relevant Medications    losartan-hydrochlorothiazide (HYZAAR) 50-12 5 mg per tablet    Other Relevant Orders    Basic metabolic panel       Other    Class 2 severe obesity due to excess calories with serious comorbidity and body mass index (BMI) of 35 0 to 35 9 in [facility])     Continued to discuss importance of weight loss  Advised healthier diet  Continue to work on improving exercise         Hypercholesterolemia     Continue simvastatin 10mg   Recheck lipid panel in 6 months          Relevant Orders    Lipid Panel with Direct LDL reflex    Snoring     Recommended sleep study               BMI Counseling: Body mass index is 35 05 kg/m²  Discussed the patient's BMI with her  The BMI is above normal  Nutrition recommendations include reducing portion sizes, decreasing overall calorie intake, 3-5 servings of fruits/vegetables daily, reducing fast food intake, consuming healthier snacks, decreasing soda and/or juice intake, increasing intake of lean protein and reducing intake of saturated fat and trans fat  Exercise recommendations include moderate aerobic physical activity for 150 minutes/week  [employer] software was used to dictate this note  It may contain errors with dictating incorrect words or incorrect spelling  Please contact the provider directly with any questions  Subjective:      Patient ID: Nick Chaudhary is a 44 y o  male  HPI    Patient presents today for 4 month follow up HTN, HLD, and obesity  No recent labs     Hypertension  He is currently on losartan 50mg daily   He is checking his BP at home about 2-3 times a week and reports 120s/ low 80s     Compliance with losartan is 100%    HLD  He is currently on simvastatin 10mg daily  Compliance is about 75%  Diet is unchanged   His job will be changing in 2 weeks so he will not be going out to lunch anymore  This is his downfall - usually eats cheesesteaks, hotdogs, etc      Obesity  Weight unchanged from last visit  Diet essentially unchanged   He is bowling twice a week which is an improvement in his physical activity  Snoring   Wife continues to report him snoring   He denies waking up during the night  A sleep study was ordered for him but he has not gone for it- he is fearful of being dx'd with MICHELLE and needing a CPAP because his wife wears one and doesn't look comfortable     The following portions of the patient's history were reviewed and updated as appropriate: allergies, current medications, past family history, past medical history, past social history, past surgical history and problem list     Review of Systems   Constitutional: Negative for activity change, appetite change, chills, diaphoresis, fatigue and fever  Respiratory: Negative for cough, chest tightness, shortness of breath and wheezing  Cardiovascular: Negative for chest pain, palpitations and leg swelling  Neurological: Negative for dizziness, light-headedness and headaches  Past Medical History:   Diagnosis Date    Class 1 obesity due to excess calories without serious comorbidity with body mass index (BMI) of 34 0 to 34 9 in adult 9/25/2018    Essential hypertension 11/6/2018    Kidney stone     Kidney stone 5/30/2019    Pneumonia          Current Outpatient Medications:     simvastatin (ZOCOR) 10 mg tablet, Take 1 tablet (10 mg total) by mouth daily at bedtime, Disp: 30 tablet, Rfl: 5    losartan-hydrochlorothiazide (HYZAAR) 50-12 5 mg per tablet, Take 1 tablet by mouth daily, Disp: 30 tablet, Rfl: 5    Allergies   Allergen Reactions    Penicillins Rash     [facility] - [id number]: HOwever he has been treated with Amoxicllin for sinusities with no problems         Social History   Past Surgical History:   Procedure Laterality Date    NO PAST SURGERIES      WISDOM TOOTH EXTRACTION      WRIST SURGERY       Family History   Problem Relation Age of Onset    Hypertension Mother     Diabetes Father     Hypertension Father     Rheumatic fever Father     Alcohol abuse Paternal Grandfather     Heart failure Paternal Grandfather     Substance Abuse Paternal Grandfather     Diabetes Family        Objective:  /92 (BP Location: Left arm, Patient Position: Sitting, Cuff Size: Large)   Pulse 84   Temp 98 2 °F (36 8 °C) (Oral)   Ht 6' (1 829 m)   Wt 117 kg (258 lb 6 4 oz) Comment: WITH SNEAKERS  BMI 35 05 kg/m²   Recheck  /92 (BP Location: Left arm, Patient Position: Sitting, Cuff Size: Large)   Pulse 84   Temp 98 2 °F (36 8 °C) (Oral)   Ht 6' (1 829 m)   Wt 117 kg (258 lb 6 4 oz) Comment: WITH SNEAKERS  BMI 35 05 kg/m²      Physical Exam   Constitutional: He is oriented to person, place, and time  He appears well-developed and well-nourished  No distress  obese   HENT:   Head: Normocephalic and atraumatic  Right Ear: Tympanic membrane and external ear normal    Left Ear: Tympanic membrane and external ear normal    Nose: Nose normal    Mouth/Throat: Oropharynx is clear and moist  No oropharyngeal exudate, posterior oropharyngeal edema or posterior oropharyngeal erythema  Eyes: Pupils are equal, round, and reactive to light  Conjunctivae and EOM are normal    Neck: Normal range of motion  Neck supple  Carotid bruit is not present  Cardiovascular: Normal rate, regular rhythm and normal heart sounds  No murmur heard  Pulmonary/Chest: Effort normal and breath sounds normal  No respiratory distress  He has no decreased breath sounds  He has no wheezes  He has no rhonchi  Musculoskeletal: He exhibits no edema  Lymphadenopathy:     He has no cervical adenopathy  Neurological: He is alert and oriented to person, place, and time  Skin: Skin is warm and dry   He is not diaphoretic  Psychiatric: He has a normal mood and affect  His behavior is normal    Vitals reviewed

## 2019-10-16 NOTE — ASSESSMENT & PLAN NOTE
Continued to discuss importance of weight loss  Advised healthier diet  Continue to work on improving exercise

## 2019-10-16 NOTE — PATIENT INSTRUCTIONS
Stop lorsatan  Start new tablet which is losartan hydrochlorothiazide 50-12 5mg once daily   Continue to monitor BP at home   Goal is < 130/80    Work on [employer] and weight loss    Follow up beginning of April - sooner if needed

## 2019-12-17 DIAGNOSIS — I10 ESSENTIAL HYPERTENSION: Primary | ICD-10-CM

## 2019-12-17 RX ORDER — LOSARTAN POTASSIUM 50 MG/1
50 TABLET ORAL DAILY
Qty: 90 TABLET | Refills: 0 | Status: SHIPPED | OUTPATIENT
Start: 2019-12-17 | End: 2020-05-29 | Stop reason: SDUPTHER

## 2019-12-17 RX ORDER — HYDROCHLOROTHIAZIDE 12.5 MG/1
12.5 TABLET ORAL DAILY
Qty: 90 TABLET | Refills: 0 | Status: SHIPPED | OUTPATIENT
Start: 2019-12-17 | End: 2020-03-18 | Stop reason: SDUPTHER

## 2019-12-17 RX ORDER — LOSARTAN POTASSIUM 50 MG/1
50 TABLET ORAL DAILY
COMMUNITY
End: 2019-12-17 | Stop reason: SDUPTHER

## 2019-12-17 RX ORDER — HYDROCHLOROTHIAZIDE 12.5 MG/1
12.5 TABLET ORAL DAILY
COMMUNITY
End: 2019-12-17 | Stop reason: SDUPTHER

## 2019-12-17 NOTE — TELEPHONE ENCOUNTER
Pt's pharmacy is out of stock of this med, Edi Funes to split rx  Pt already has refills but they can't be filled due to the shortage

## 2020-02-24 DIAGNOSIS — E78.00 HYPERCHOLESTEROLEMIA: ICD-10-CM

## 2020-02-25 RX ORDER — SIMVASTATIN 10 MG
10 TABLET ORAL
Qty: 90 TABLET | Refills: 1 | Status: SHIPPED | OUTPATIENT
Start: 2020-02-25 | End: 2021-01-14 | Stop reason: SDUPTHER

## 2020-03-18 DIAGNOSIS — I10 ESSENTIAL HYPERTENSION: ICD-10-CM

## 2020-03-19 RX ORDER — HYDROCHLOROTHIAZIDE 12.5 MG/1
12.5 TABLET ORAL DAILY
Qty: 90 TABLET | Refills: 0 | Status: SHIPPED | OUTPATIENT
Start: 2020-03-19 | End: 2020-05-29 | Stop reason: SDUPTHER

## 2020-05-21 ENCOUNTER — TELEPHONE (OUTPATIENT)
Dept: INTERNAL MEDICINE CLINIC | Facility: CLINIC | Age: 40
End: 2020-05-21

## 2020-05-29 ENCOUNTER — TELEMEDICINE (OUTPATIENT)
Dept: INTERNAL MEDICINE CLINIC | Facility: CLINIC | Age: 40
End: 2020-05-29
Payer: COMMERCIAL

## 2020-05-29 VITALS
BODY MASS INDEX: 32.64 KG/M2 | HEIGHT: 72 IN | TEMPERATURE: 97.8 F | SYSTOLIC BLOOD PRESSURE: 118 MMHG | HEART RATE: 85 BPM | DIASTOLIC BLOOD PRESSURE: 81 MMHG | WEIGHT: 241 LBS

## 2020-05-29 DIAGNOSIS — E78.00 HYPERCHOLESTEROLEMIA: ICD-10-CM

## 2020-05-29 DIAGNOSIS — I10 ESSENTIAL HYPERTENSION: Primary | ICD-10-CM

## 2020-05-29 DIAGNOSIS — E66.09 CLASS 1 OBESITY DUE TO EXCESS CALORIES WITHOUT SERIOUS COMORBIDITY WITH BODY MASS INDEX (BMI) OF 32.0 TO 32.9 IN ADULT: ICD-10-CM

## 2020-05-29 DIAGNOSIS — R06.83 SNORING: ICD-10-CM

## 2020-05-29 PROCEDURE — 99214 OFFICE O/P EST MOD 30 MIN: CPT | Performed by: NURSE PRACTITIONER

## 2020-05-29 PROCEDURE — 3079F DIAST BP 80-89 MM HG: CPT | Performed by: NURSE PRACTITIONER

## 2020-05-29 PROCEDURE — 3008F BODY MASS INDEX DOCD: CPT | Performed by: NURSE PRACTITIONER

## 2020-05-29 PROCEDURE — 3074F SYST BP LT 130 MM HG: CPT | Performed by: NURSE PRACTITIONER

## 2020-05-29 RX ORDER — LOSARTAN POTASSIUM 50 MG/1
50 TABLET ORAL DAILY
Qty: 90 TABLET | Refills: 1 | Status: SHIPPED | OUTPATIENT
Start: 2020-05-29 | End: 2021-01-14 | Stop reason: SDUPTHER

## 2020-05-29 RX ORDER — HYDROCHLOROTHIAZIDE 12.5 MG/1
12.5 TABLET ORAL DAILY
Qty: 90 TABLET | Refills: 1 | Status: SHIPPED | OUTPATIENT
Start: 2020-05-29 | End: 2021-01-14 | Stop reason: SDUPTHER

## 2021-01-14 DIAGNOSIS — E78.00 HYPERCHOLESTEROLEMIA: ICD-10-CM

## 2021-01-14 DIAGNOSIS — I10 ESSENTIAL HYPERTENSION: ICD-10-CM

## 2021-01-15 RX ORDER — HYDROCHLOROTHIAZIDE 12.5 MG/1
12.5 TABLET ORAL DAILY
Qty: 30 TABLET | Refills: 0 | Status: SHIPPED | OUTPATIENT
Start: 2021-01-15 | End: 2021-02-23 | Stop reason: SDUPTHER

## 2021-01-15 RX ORDER — SIMVASTATIN 10 MG
10 TABLET ORAL
Qty: 30 TABLET | Refills: 0 | Status: SHIPPED | OUTPATIENT
Start: 2021-01-15 | End: 2021-02-23 | Stop reason: SDUPTHER

## 2021-01-15 RX ORDER — LOSARTAN POTASSIUM 50 MG/1
50 TABLET ORAL DAILY
Qty: 30 TABLET | Refills: 0 | Status: SHIPPED | OUTPATIENT
Start: 2021-01-15 | End: 2021-02-23 | Stop reason: SDUPTHER

## 2021-02-21 DIAGNOSIS — I10 ESSENTIAL HYPERTENSION: ICD-10-CM

## 2021-02-22 RX ORDER — LOSARTAN POTASSIUM 50 MG/1
50 TABLET ORAL DAILY
Qty: 90 TABLET | Refills: 1 | OUTPATIENT
Start: 2021-02-22

## 2021-02-22 NOTE — TELEPHONE ENCOUNTER
LOV 05/29/2090    NOV **LEFT MESSAGE TO MAKE APPT FOR REFILLS** PT WAS ALREADY ADVISED IN [facility] THAT HE NEEDS AN APPT  PLEASE DO NOT FILL UNTIL APPT IS MADE

## 2021-02-23 ENCOUNTER — OFFICE VISIT (OUTPATIENT)
Dept: INTERNAL MEDICINE CLINIC | Facility: CLINIC | Age: 41
End: 2021-02-23
Payer: COMMERCIAL

## 2021-02-23 VITALS
SYSTOLIC BLOOD PRESSURE: 120 MMHG | DIASTOLIC BLOOD PRESSURE: 82 MMHG | BODY MASS INDEX: 34.4 KG/M2 | WEIGHT: 254 LBS | OXYGEN SATURATION: 98 % | TEMPERATURE: 99.1 F | HEIGHT: 72 IN | HEART RATE: 90 BPM

## 2021-02-23 DIAGNOSIS — Z11.4 SCREENING FOR HIV (HUMAN IMMUNODEFICIENCY VIRUS): ICD-10-CM

## 2021-02-23 DIAGNOSIS — E66.09 CLASS 1 OBESITY DUE TO EXCESS CALORIES WITHOUT SERIOUS COMORBIDITY WITH BODY MASS INDEX (BMI) OF 34.0 TO 34.9 IN ADULT: ICD-10-CM

## 2021-02-23 DIAGNOSIS — E78.00 HYPERCHOLESTEROLEMIA: ICD-10-CM

## 2021-02-23 DIAGNOSIS — F41.9 ANXIETY: ICD-10-CM

## 2021-02-23 DIAGNOSIS — Z13.0 SCREENING FOR DEFICIENCY ANEMIA: ICD-10-CM

## 2021-02-23 DIAGNOSIS — I10 ESSENTIAL HYPERTENSION: Primary | ICD-10-CM

## 2021-02-23 PROCEDURE — 3725F SCREEN DEPRESSION PERFORMED: CPT | Performed by: NURSE PRACTITIONER

## 2021-02-23 PROCEDURE — 3074F SYST BP LT 130 MM HG: CPT | Performed by: NURSE PRACTITIONER

## 2021-02-23 PROCEDURE — 3079F DIAST BP 80-89 MM HG: CPT | Performed by: NURSE PRACTITIONER

## 2021-02-23 PROCEDURE — 1036F TOBACCO NON-USER: CPT | Performed by: NURSE PRACTITIONER

## 2021-02-23 PROCEDURE — 99214 OFFICE O/P EST MOD 30 MIN: CPT | Performed by: NURSE PRACTITIONER

## 2021-02-23 PROCEDURE — 3008F BODY MASS INDEX DOCD: CPT | Performed by: NURSE PRACTITIONER

## 2021-02-23 RX ORDER — SIMVASTATIN 10 MG
10 TABLET ORAL
Qty: 90 TABLET | Refills: 1 | Status: SHIPPED | OUTPATIENT
Start: 2021-02-23 | End: 2021-09-17 | Stop reason: SDUPTHER

## 2021-02-23 RX ORDER — HYDROCHLOROTHIAZIDE 12.5 MG/1
12.5 TABLET ORAL DAILY
Qty: 90 TABLET | Refills: 1 | Status: SHIPPED | OUTPATIENT
Start: 2021-02-23 | End: 2021-09-17 | Stop reason: SDUPTHER

## 2021-02-23 RX ORDER — LOSARTAN POTASSIUM 50 MG/1
50 TABLET ORAL DAILY
Qty: 90 TABLET | Refills: 1 | Status: SHIPPED | OUTPATIENT
Start: 2021-02-23 | End: 2021-09-17 | Stop reason: SDUPTHER

## 2021-02-23 NOTE — PROGRESS NOTES
Assessment/Plan:    Problem List Items Addressed This Visit        Cardiovascular and Mediastinum    Essential hypertension - Primary     Controlled on current regimen of losartan 50 and HCTZ 12 5 mg daily         Relevant Medications    hydrochlorothiazide (HYDRODIURIL) 12 5 mg tablet    losartan (COZAAR) 50 mg tablet    Other Relevant Orders    Comprehensive metabolic panel       Other    Class 1 obesity due to excess calories without serious comorbidity with body mass index (BMI) of 34 0 to 34 9 in adult     Discussed diet and exercise with pt          Hypercholesterolemia     Continue simvastatin q h s  Update lipid panel         Relevant Medications    simvastatin (ZOCOR) 10 mg tablet    Other Relevant Orders    Comprehensive metabolic panel    Lipid Panel with Direct LDL reflex    CBC    Anxiety     Referral given to counseling          Relevant Orders    Ambulatory referral to Psychology      Other Visit Diagnoses     Screening for deficiency anemia        Relevant Orders    CBC    Screening for HIV (human immunodeficiency virus)        Relevant Orders    HIV 1/2 Antigen/Antibody (4th Generation) w Reflex [facility]          BMI Counseling: Body mass index is 34 45 kg/m²  Discussed the patient's BMI with him  The BMI is above normal  Nutrition recommendations include reducing portion sizes, decreasing overall calorie intake, 3-5 servings of fruits/vegetables daily, moderation in carbohydrate intake, increasing intake of lean protein and reducing intake of saturated fat and trans fat  Exercise recommendations include moderate aerobic physical activity for 150 minutes/week  [employer] software was used to dictate this note  It may contain errors with dictating incorrect words or incorrect spelling  Please contact the provider directly with any questions  Subjective:      Patient ID: Clinton Toussaint is a 39 y o  male  HPI     Patient presents today for 6 month follow up HTN and HLD     No new labs for today's visit  He has been more stressed lately  His 6 yr old son has ADHD and ODD and was doing school virtually which was not working out well for him  He was getting into trouble and struggling  This was frustrating for nate and he was able to get his son back into in person school  He thinks he would benefit from talking to a counselor  HTN - currently on losartan 50mg and HCTZ 12 5mg  he is checking his BP at home 3x a week  Home readings are 110s-120s over 80s  HLD - no recent lipid panel  He is currently on simvastatin 10mg daily  The following portions of the patient's history were reviewed and updated as appropriate: allergies, current medications, past family history, past medical history, past social history, past surgical history and problem list     Review of Systems   Constitutional: Negative for activity change, chills and fever  Respiratory: Negative for cough, chest tightness, shortness of breath and wheezing  Cardiovascular: Negative for chest pain, palpitations and leg swelling  Past Medical History:   Diagnosis Date    Class 1 obesity due to excess calories without serious comorbidity with body mass index (BMI) of 34 0 to 34 9 in adult 9/25/2018    Essential hypertension 11/6/2018    Kidney stone     Kidney stone 5/30/2019    Pneumonia          Current Outpatient Medications:     hydrochlorothiazide (HYDRODIURIL) 12 5 mg tablet, Take 1 tablet (12 5 mg total) by mouth daily, Disp: 90 tablet, Rfl: 1    losartan (COZAAR) 50 mg tablet, Take 1 tablet (50 mg total) by mouth daily, Disp: 90 tablet, Rfl: 1    simvastatin (ZOCOR) 10 mg tablet, Take 1 tablet (10 mg total) by mouth daily at bedtime, Disp: 90 tablet, Rfl: 1    Allergies   Allergen Reactions    Penicillins Rash     Annotation - [id number]: HOwever he has been treated with Amoxicllin for sinusities with no problems         Social History   Past Surgical History:   Procedure Laterality Date    NO PAST SURGERIES      WISDOM TOOTH EXTRACTION      WRIST SURGERY       Family History   Problem Relation Age of Onset    Hypertension Mother     Diabetes Father     Hypertension Father     Rheumatic fever Father     Alcohol abuse Paternal Grandfather     Heart failure Paternal Grandfather     Substance Abuse Paternal Grandfather     Diabetes Family      Objective:  /82 (BP Location: Left arm, Patient Position: Sitting, Cuff Size: Large)   Pulse 90   Temp 99 1 °F (37 3 °C) (Temporal)   Ht 6' (1 829 m)   Wt 115 kg (254 lb)   SpO2 98% Comment: ra  BMI 34 45 kg/m²      Physical Exam  Vitals signs reviewed  Constitutional:       General: He is not in acute distress  Appearance: Normal appearance  He is well-developed  He is obese  He is not diaphoretic  HENT:      Head: Normocephalic and atraumatic  Comments: masked     Right Ear: Tympanic membrane and external ear normal       Left Ear: Tympanic membrane and external ear normal    Eyes:      Extraocular Movements: Extraocular movements intact  Conjunctiva/sclera: Conjunctivae normal       Pupils: Pupils are equal, round, and reactive to light  Neck:      Musculoskeletal: Normal range of motion and neck supple  Vascular: No carotid bruit  Cardiovascular:      Rate and Rhythm: Normal rate and regular rhythm  Heart sounds: Normal heart sounds  No murmur  Pulmonary:      Effort: Pulmonary effort is normal  No respiratory distress  Breath sounds: Normal breath sounds  No decreased breath sounds, wheezing, rhonchi or rales  Musculoskeletal:      Right lower leg: No edema  Left lower leg: No edema  Lymphadenopathy:      Cervical: No cervical adenopathy  Skin:     General: Skin is warm and dry  Neurological:      Mental Status: He is alert and oriented to person, place, and time  Mental status is at baseline     Psychiatric:         Mood and Affect: Mood normal          Behavior: Behavior normal

## 2021-03-30 DIAGNOSIS — Z23 ENCOUNTER FOR IMMUNIZATION: ICD-10-CM

## 2021-08-28 ENCOUNTER — APPOINTMENT (OUTPATIENT)
Dept: LAB | Age: 41
End: 2021-08-28
Payer: COMMERCIAL

## 2021-08-28 DIAGNOSIS — I10 ESSENTIAL HYPERTENSION: ICD-10-CM

## 2021-08-28 DIAGNOSIS — Z13.0 SCREENING FOR DEFICIENCY ANEMIA: ICD-10-CM

## 2021-08-28 DIAGNOSIS — E78.00 HYPERCHOLESTEROLEMIA: ICD-10-CM

## 2021-08-28 DIAGNOSIS — Z11.4 SCREENING FOR HIV (HUMAN IMMUNODEFICIENCY VIRUS): ICD-10-CM

## 2021-08-28 LAB
ALBUMIN SERPL BCP-MCNC: 3.7 G/DL (ref 3.5–5)
ALP SERPL-CCNC: 85 U/L (ref 46–116)
ALT SERPL W P-5'-P-CCNC: 47 U/L (ref 12–78)
ANION GAP SERPL CALCULATED.3IONS-SCNC: 1 MMOL/L (ref 4–13)
AST SERPL W P-5'-P-CCNC: 14 U/L (ref 5–45)
BILIRUB SERPL-MCNC: 0.31 MG/DL (ref 0.2–1)
BUN SERPL-MCNC: 11 MG/DL (ref 5–25)
CALCIUM SERPL-MCNC: 8.5 MG/DL (ref 8.3–10.1)
CHLORIDE SERPL-SCNC: 108 MMOL/L (ref 100–108)
CHOLEST SERPL-MCNC: 184 MG/DL (ref 50–200)
CO2 SERPL-SCNC: 29 MMOL/L (ref 21–32)
CREAT SERPL-MCNC: 0.79 MG/DL (ref 0.6–1.3)
ERYTHROCYTE [DISTWIDTH] IN BLOOD BY AUTOMATED COUNT: 13.7 % (ref 11.6–15.1)
GFR SERPL CREATININE-BSD FRML MDRD: 112 ML/MIN/1.73SQ M
GLUCOSE P FAST SERPL-MCNC: 105 MG/DL (ref 65–99)
HCT VFR BLD AUTO: 44.7 % (ref 36.5–49.3)
HDLC SERPL-MCNC: 33 MG/DL
HGB BLD-MCNC: 14.6 G/DL (ref 12–17)
LDLC SERPL CALC-MCNC: 136 MG/DL (ref 0–100)
MCH RBC QN AUTO: 28.9 PG (ref 26.8–34.3)
MCHC RBC AUTO-ENTMCNC: 32.7 G/DL (ref 31.4–37.4)
MCV RBC AUTO: 89 FL (ref 82–98)
PLATELET # BLD AUTO: 267 THOUSANDS/UL (ref 149–390)
PMV BLD AUTO: 10.6 FL (ref 8.9–12.7)
POTASSIUM SERPL-SCNC: 4.1 MMOL/L (ref 3.5–5.3)
PROT SERPL-MCNC: 7.2 G/DL (ref 6.4–8.2)
RBC # BLD AUTO: 5.05 MILLION/UL (ref 3.88–5.62)
SODIUM SERPL-SCNC: 138 MMOL/L (ref 136–145)
TRIGL SERPL-MCNC: 75 MG/DL
WBC # BLD AUTO: 6.28 THOUSAND/UL (ref 4.31–10.16)

## 2021-08-28 PROCEDURE — 36415 COLL VENOUS BLD VENIPUNCTURE: CPT

## 2021-08-28 PROCEDURE — 80061 LIPID PANEL: CPT

## 2021-08-28 PROCEDURE — 85027 COMPLETE CBC AUTOMATED: CPT

## 2021-08-28 PROCEDURE — 87389 HIV-1 AG W/HIV-1&-2 AB AG IA: CPT

## 2021-08-28 PROCEDURE — 80053 COMPREHEN METABOLIC PANEL: CPT

## 2021-09-01 LAB — HIV 1+2 AB+HIV1 P24 AG SERPL QL IA: NORMAL

## 2021-09-17 ENCOUNTER — OFFICE VISIT (OUTPATIENT)
Dept: INTERNAL MEDICINE CLINIC | Facility: CLINIC | Age: 41
End: 2021-09-17
Payer: COMMERCIAL

## 2021-09-17 VITALS
WEIGHT: 263.6 LBS | BODY MASS INDEX: 35.7 KG/M2 | DIASTOLIC BLOOD PRESSURE: 90 MMHG | SYSTOLIC BLOOD PRESSURE: 128 MMHG | OXYGEN SATURATION: 99 % | HEART RATE: 84 BPM | HEIGHT: 72 IN | TEMPERATURE: 98.3 F

## 2021-09-17 DIAGNOSIS — R73.01 IMPAIRED FASTING GLUCOSE: ICD-10-CM

## 2021-09-17 DIAGNOSIS — I10 ESSENTIAL HYPERTENSION: Primary | ICD-10-CM

## 2021-09-17 DIAGNOSIS — E78.00 HYPERCHOLESTEROLEMIA: ICD-10-CM

## 2021-09-17 DIAGNOSIS — E66.01 CLASS 2 SEVERE OBESITY DUE TO EXCESS CALORIES WITH SERIOUS COMORBIDITY AND BODY MASS INDEX (BMI) OF 35.0 TO 35.9 IN ADULT (HCC): ICD-10-CM

## 2021-09-17 DIAGNOSIS — Z11.59 NEED FOR HEPATITIS C SCREENING TEST: ICD-10-CM

## 2021-09-17 PROCEDURE — 3080F DIAST BP >= 90 MM HG: CPT | Performed by: NURSE PRACTITIONER

## 2021-09-17 PROCEDURE — 1036F TOBACCO NON-USER: CPT | Performed by: NURSE PRACTITIONER

## 2021-09-17 PROCEDURE — 99214 OFFICE O/P EST MOD 30 MIN: CPT | Performed by: NURSE PRACTITIONER

## 2021-09-17 PROCEDURE — 3008F BODY MASS INDEX DOCD: CPT | Performed by: NURSE PRACTITIONER

## 2021-09-17 PROCEDURE — 3074F SYST BP LT 130 MM HG: CPT | Performed by: NURSE PRACTITIONER

## 2021-09-17 RX ORDER — HYDROCHLOROTHIAZIDE 12.5 MG/1
12.5 TABLET ORAL DAILY
Qty: 90 TABLET | Refills: 1 | Status: SHIPPED | OUTPATIENT
Start: 2021-09-17 | End: 2022-04-08 | Stop reason: SDUPTHER

## 2021-09-17 RX ORDER — SIMVASTATIN 10 MG
10 TABLET ORAL
Qty: 90 TABLET | Refills: 1 | Status: SHIPPED | OUTPATIENT
Start: 2021-09-17 | End: 2022-04-08 | Stop reason: SDUPTHER

## 2021-09-17 RX ORDER — LOSARTAN POTASSIUM 50 MG/1
50 TABLET ORAL DAILY
Qty: 90 TABLET | Refills: 1 | Status: SHIPPED | OUTPATIENT
Start: 2021-09-17 | End: 2022-04-08 | Stop reason: SDUPTHER

## 2021-09-17 RX ORDER — LOSARTAN POTASSIUM 25 MG/1
25 TABLET ORAL DAILY
Qty: 90 TABLET | Refills: 1 | Status: SHIPPED | OUTPATIENT
Start: 2021-09-17 | End: 2022-04-08 | Stop reason: SDUPTHER

## 2021-09-17 NOTE — PROGRESS NOTES
Assessment/Plan:    Problem List Items Addressed This Visit        Endocrine    Impaired fasting glucose     Check Hba1c prior to follow up         Relevant Orders    HEMOGLOBIN A1C W/ EAG ESTIMATION       Cardiovascular and Mediastinum    Essential hypertension - Primary     Increase losartan to 75mg daily  Continue HCTZ 12 5mg daily  Continue to monitor BP at home  Parameters given of when to call office         Relevant Medications    losartan (COZAAR) 25 mg tablet    losartan (COZAAR) 50 mg tablet    hydrochlorothiazide (HYDRODIURIL) 12 5 mg tablet    Other Relevant Orders    Basic metabolic panel       Other    Class 2 severe obesity due to excess calories with serious comorbidity and body mass index (BMI) of 35 0 to 35 9 in [facility])     Discussed exercise and diet modifications and recommendations for weight loss         Relevant Orders    Lipid Panel with Direct LDL reflex    Basic metabolic panel    Hypercholesterolemia     Continue simvastatin 10mg daily  Start fish oil 1000mg daily          Relevant Medications    simvastatin (ZOCOR) 10 mg tablet    Other Relevant Orders    Lipid Panel with Direct LDL reflex    Basic metabolic panel      Other Visit Diagnoses     Need for hepatitis C screening test        Relevant Orders    Hepatitis C antibody          BMI Counseling: Body mass index is 35 75 kg/m²  Discussed the patient's BMI with him  The BMI is above normal  Nutrition recommendations include decreasing overall calorie intake, 3-5 servings of fruits/vegetables daily, moderation in carbohydrate intake and increasing intake of lean protein  Exercise recommendations include moderate aerobic physical activity for 150 minutes/week  M*Modal software was used to dictate this note  It may contain errors with dictating incorrect words or incorrect spelling  Please contact the provider directly with any questions  Subjective:      Patient ID: Idania Mills is a 39 y o  male      HPI    Patient presents today for routine follow up  He had labs completed 8/28    HTN - He is currently on losartan 50mg daily and HCTZ 12 5mg daily  Monitoring BP at home and typically 120/high 80s/ low 90s    Hyperlipidemia  Ashley Rahman is a 39 y o  male who presents for follow up of dyslipidemia  A repeat fasting lipid profile was done  Total cholesterol 184 ; Triglycerides 75 ; HDL 33 ;    Previous history of cardiac disease includes: none   He is currently on simvastatin 10mg daily  No issues with medication    The 10-year ASCVD risk score (Emilio Hart et al , 2013) is: 2 3%    Values used to calculate the score:      Age: 39 years      Sex: Male      Is Non- : No      Diabetic: No      Tobacco smoker: No      Systolic Blood Pressure: 108 mmHg      Is BP treated: Yes      HDL Cholesterol: 33 mg/dL      Total Cholesterol: 184 mg/dL        The following portions of the patient's history were reviewed and updated as appropriate: allergies, current medications, past family history, past medical history, past social history, past surgical history and problem list     Review of Systems   Constitutional: Negative for appetite change, chills and fever  Eyes: Negative for visual disturbance  Respiratory: Negative for cough, chest tightness, shortness of breath and wheezing  Cardiovascular: Negative for chest pain and palpitations  Neurological: Negative for headaches           Past Medical History:   Diagnosis Date    Class 1 obesity due to excess calories without serious comorbidity with body mass index (BMI) of 34 0 to 34 9 in adult 9/25/2018    Essential hypertension 11/6/2018    Kidney stone     Kidney stone 5/30/2019    Pneumonia          Current Outpatient Medications:     hydrochlorothiazide (HYDRODIURIL) 12 5 mg tablet, Take 1 tablet (12 5 mg total) by mouth daily, Disp: 90 tablet, Rfl: 1    losartan (COZAAR) 50 mg tablet, Take 1 tablet (50 mg total) by mouth daily, Disp: 90 tablet, Rfl: 1    simvastatin (ZOCOR) 10 mg tablet, Take 1 tablet (10 mg total) by mouth daily at bedtime, Disp: 90 tablet, Rfl: 1    losartan (COZAAR) 25 mg tablet, Take 1 tablet (25 mg total) by mouth daily, Disp: 90 tablet, Rfl: 1    Allergies   Allergen Reactions    Penicillins Rash     Annotation - [id number]: HOwever he has been treated with Amoxicllin for sinusities with no problems  Social History   Past Surgical History:   Procedure Laterality Date    NO PAST SURGERIES      WISDOM TOOTH EXTRACTION      WRIST SURGERY       Family History   Problem Relation Age of Onset    Hypertension Mother     Diabetes Father     Hypertension Father     Rheumatic fever Father     Alcohol abuse Paternal Grandfather     Heart failure Paternal Grandfather     Substance Abuse Paternal Grandfather     Diabetes Family        Objective:  /90 (BP Location: Left arm, Patient Position: Sitting, Cuff Size: Large)   Pulse 84   Temp 98 3 °F (36 8 °C) (Temporal)   Ht 6' (1 829 m)   Wt 120 kg (263 lb 9 6 oz)   SpO2 99% Comment: ra  BMI 35 75 kg/m²   Recheck  /90 (BP Location: Left arm, Patient Position: Sitting, Cuff Size: Large)   Pulse 84   Temp 98 3 °F (36 8 °C) (Temporal)   Ht 6' (1 829 m)   Wt 120 kg (263 lb 9 6 oz)   SpO2 99% Comment: ra  BMI 35 75 kg/m²      Physical Exam  Vitals reviewed  Constitutional:       General: He is not in acute distress  Appearance: Normal appearance  He is obese  He is not diaphoretic  HENT:      Head: Normocephalic and atraumatic  Eyes:      Extraocular Movements: Extraocular movements intact  Conjunctiva/sclera: Conjunctivae normal       Pupils: Pupils are equal, round, and reactive to light  Neck:      Vascular: No carotid bruit  Cardiovascular:      Rate and Rhythm: Normal rate and regular rhythm  Heart sounds: Normal heart sounds  No murmur heard  Pulmonary:      Effort: Pulmonary effort is normal  No respiratory distress  Breath sounds: Normal breath sounds  No wheezing, rhonchi or rales  Musculoskeletal:      Right lower leg: No edema  Left lower leg: No edema  Lymphadenopathy:      Cervical: No cervical adenopathy  Skin:     General: Skin is warm and dry  Neurological:      Mental Status: He is alert and oriented to person, place, and time  Mental status is at baseline     Psychiatric:         Mood and Affect: Mood normal          Behavior: Behavior normal

## 2021-09-17 NOTE — ASSESSMENT & PLAN NOTE
Increase losartan to 75mg daily  Continue HCTZ 12 5mg daily  Continue to monitor BP at home  Parameters given of when to call office

## 2022-04-08 DIAGNOSIS — I10 ESSENTIAL HYPERTENSION: ICD-10-CM

## 2022-04-08 DIAGNOSIS — E78.00 HYPERCHOLESTEROLEMIA: ICD-10-CM

## 2022-04-08 RX ORDER — HYDROCHLOROTHIAZIDE 12.5 MG/1
12.5 TABLET ORAL DAILY
Qty: 90 TABLET | Refills: 1 | Status: SHIPPED | OUTPATIENT
Start: 2022-04-08

## 2022-04-08 RX ORDER — LOSARTAN POTASSIUM 50 MG/1
50 TABLET ORAL DAILY
Qty: 90 TABLET | Refills: 1 | Status: SHIPPED | OUTPATIENT
Start: 2022-04-08

## 2022-04-08 RX ORDER — LOSARTAN POTASSIUM 25 MG/1
25 TABLET ORAL DAILY
Qty: 90 TABLET | Refills: 1 | Status: SHIPPED | OUTPATIENT
Start: 2022-04-08

## 2022-04-08 RX ORDER — SIMVASTATIN 10 MG
10 TABLET ORAL
Qty: 90 TABLET | Refills: 1 | Status: SHIPPED | OUTPATIENT
Start: 2022-04-08

## 2022-04-22 ENCOUNTER — OFFICE VISIT (OUTPATIENT)
Dept: INTERNAL MEDICINE CLINIC | Facility: OTHER | Age: 42
End: 2022-04-22
Payer: COMMERCIAL

## 2022-04-22 VITALS
SYSTOLIC BLOOD PRESSURE: 126 MMHG | DIASTOLIC BLOOD PRESSURE: 80 MMHG | HEIGHT: 72 IN | OXYGEN SATURATION: 99 % | TEMPERATURE: 98.4 F | WEIGHT: 265 LBS | HEART RATE: 94 BPM | BODY MASS INDEX: 35.89 KG/M2

## 2022-04-22 DIAGNOSIS — I10 ESSENTIAL HYPERTENSION: Primary | ICD-10-CM

## 2022-04-22 DIAGNOSIS — F43.21 GRIEF REACTION: ICD-10-CM

## 2022-04-22 DIAGNOSIS — E78.00 HYPERCHOLESTEROLEMIA: ICD-10-CM

## 2022-04-22 PROBLEM — F43.20 GRIEF REACTION: Status: ACTIVE | Noted: 2022-04-22

## 2022-04-22 PROCEDURE — 3074F SYST BP LT 130 MM HG: CPT | Performed by: NURSE PRACTITIONER

## 2022-04-22 PROCEDURE — 1036F TOBACCO NON-USER: CPT | Performed by: NURSE PRACTITIONER

## 2022-04-22 PROCEDURE — 3079F DIAST BP 80-89 MM HG: CPT | Performed by: NURSE PRACTITIONER

## 2022-04-22 PROCEDURE — 3008F BODY MASS INDEX DOCD: CPT | Performed by: NURSE PRACTITIONER

## 2022-04-22 PROCEDURE — 99213 OFFICE O/P EST LOW 20 MIN: CPT | Performed by: NURSE PRACTITIONER

## 2022-04-22 PROCEDURE — 3725F SCREEN DEPRESSION PERFORMED: CPT | Performed by: NURSE PRACTITIONER

## 2022-04-22 NOTE — PROGRESS NOTES
Assessment/Plan:    Problem List Items Addressed This Visit        Cardiovascular and Mediastinum    Essential hypertension - Primary     - controlled on losartan 75 mg daily and hydrochlorothiazide 12 5 mg daily            Other    Hypercholesterolemia     - continue simvastatin 10 mg daily         Grief reaction     - patient continues to grieve after losing his father 4 months ago to [place]  - he feels that his symptoms are improving and at this time he does not wish to start any medications or see a counselor  - he is aware to call me if he changes his mind and would like a referral or would like to start a medication               BMI Counseling: Body mass index is 35 94 kg/m²  Discussed the patient's BMI with him  The BMI is above normal  Nutrition recommendations include reducing portion sizes, decreasing overall calorie intake, 3-5 servings of fruits/vegetables daily, consuming healthier snacks, moderation in carbohydrate intake and increasing intake of lean protein  Exercise recommendations include moderate aerobic physical activity for 150 minutes/week  Depression Screening Follow-up Plan: Patient's depression screening was positive with a PHQ-2 score of 4  Their PHQ-9 score was 5  Clinically patient does not have depression  No treatment is required  M*Modal software was used to dictate this note  It may contain errors with dictating incorrect words or incorrect spelling  Please contact the provider directly with any questions  Subjective:      Patient ID: Cristian Young is a 43 y o  male  HPI    Patient presents today for 6 month follow up  He did not have his labs completed    BP- well controlled  He is monitoring his BP at home, 122/low 80s    HLD- compliant with his simvastatin 10mg daily     He has been dealing with some grieving and mild depression after his father passed away 4 months ago to COVID infection    Him and his father were very close and his father was in good health so this was very unexpected  Initially he feels that he was very depressed and not leaving his house or doing things that he enjoys  He did see a grief counselor twice but has not seen them since  He now feels that his symptoms are improving and he is starting day get back into his daily routine if things  At this time he does not wish to start a medication or see a counselor      The following portions of the patient's history were reviewed and updated as appropriate: allergies, current medications, past family history, past medical history, past social history, past surgical history and problem list     Review of Systems   Eyes: Negative for visual disturbance  Respiratory: Negative for chest tightness and shortness of breath  Cardiovascular: Negative for chest pain and palpitations  Neurological: Negative for headaches  Past Medical History:   Diagnosis Date    Class 1 obesity due to excess calories without serious comorbidity with body mass index (BMI) of 34 0 to 34 9 in adult 9/25/2018    Essential hypertension 11/6/2018    Kidney stone     Kidney stone 5/30/2019    Pneumonia          Current Outpatient Medications:     hydrochlorothiazide (HYDRODIURIL) 12 5 mg tablet, Take 1 tablet (12 5 mg total) by mouth daily, Disp: 90 tablet, Rfl: 1    losartan (COZAAR) 25 mg tablet, Take 1 tablet (25 mg total) by mouth daily, Disp: 90 tablet, Rfl: 1    losartan (COZAAR) 50 mg tablet, Take 1 tablet (50 mg total) by mouth daily, Disp: 90 tablet, Rfl: 1    simvastatin (ZOCOR) 10 mg tablet, Take 1 tablet (10 mg total) by mouth daily at bedtime, Disp: 90 tablet, Rfl: 1    Allergies   Allergen Reactions    Penicillins Rash     Annotation - [id number]: HOwever he has been treated with Amoxicllin for sinusities with no problems         Social History   Past Surgical History:   Procedure Laterality Date    NO PAST SURGERIES      WISDOM TOOTH EXTRACTION      WRIST SURGERY       Family History Problem Relation Age of Onset    Hypertension Mother     Diabetes Father     Hypertension Father     Rheumatic fever Father     Alcohol abuse Paternal Grandfather     Heart failure Paternal Grandfather     Substance Abuse Paternal Grandfather     Diabetes Family        Objective:  /80 (BP Location: Left arm, Patient Position: Sitting, Cuff Size: Adult)   Pulse 94   Temp 98 4 °F (36 9 °C) (Temporal)   Ht 6' (1 829 m)   Wt 120 kg (265 lb)   SpO2 99%   BMI 35 94 kg/m²      Physical Exam  Vitals reviewed  Constitutional:       General: He is not in acute distress  Appearance: Normal appearance  He is obese  He is not diaphoretic  HENT:      Head: Normocephalic and atraumatic  Eyes:      Extraocular Movements: Extraocular movements intact  Conjunctiva/sclera: Conjunctivae normal       Pupils: Pupils are equal, round, and reactive to light  Cardiovascular:      Rate and Rhythm: Normal rate and regular rhythm  Heart sounds: Normal heart sounds  No murmur heard  Pulmonary:      Effort: Pulmonary effort is normal  No respiratory distress  Breath sounds: Normal breath sounds  No wheezing, rhonchi or rales  Neurological:      Mental Status: He is alert and oriented to person, place, and time  Mental status is at baseline     Psychiatric:         Mood and Affect: Mood normal          Behavior: Behavior normal

## 2022-04-22 NOTE — ASSESSMENT & PLAN NOTE
- patient continues to grieve after losing his father 4 months ago to [place]  - he feels that his symptoms are improving and at this time he does not wish to start any medications or see a counselor  - he is aware to call me if he changes his mind and would like a referral or would like to start a medication

## 2022-04-23 ENCOUNTER — APPOINTMENT (OUTPATIENT)
Dept: LAB | Age: 42
End: 2022-04-23
Payer: COMMERCIAL

## 2022-04-23 DIAGNOSIS — I10 ESSENTIAL HYPERTENSION: ICD-10-CM

## 2022-04-23 DIAGNOSIS — E66.01 CLASS 2 SEVERE OBESITY DUE TO EXCESS CALORIES WITH SERIOUS COMORBIDITY AND BODY MASS INDEX (BMI) OF 35.0 TO 35.9 IN ADULT (HCC): ICD-10-CM

## 2022-04-23 DIAGNOSIS — R73.01 IMPAIRED FASTING GLUCOSE: ICD-10-CM

## 2022-04-23 DIAGNOSIS — E78.00 HYPERCHOLESTEROLEMIA: ICD-10-CM

## 2022-04-23 DIAGNOSIS — Z11.59 NEED FOR HEPATITIS C SCREENING TEST: ICD-10-CM

## 2022-04-23 LAB
ANION GAP SERPL CALCULATED.3IONS-SCNC: 4 MMOL/L (ref 4–13)
BUN SERPL-MCNC: 11 MG/DL (ref 5–25)
CALCIUM SERPL-MCNC: 8.8 MG/DL (ref 8.3–10.1)
CHLORIDE SERPL-SCNC: 106 MMOL/L (ref 100–108)
CHOLEST SERPL-MCNC: 168 MG/DL
CO2 SERPL-SCNC: 29 MMOL/L (ref 21–32)
CREAT SERPL-MCNC: 0.93 MG/DL (ref 0.6–1.3)
EST. AVERAGE GLUCOSE BLD GHB EST-MCNC: 126 MG/DL
GFR SERPL CREATININE-BSD FRML MDRD: 100 ML/MIN/1.73SQ M
GLUCOSE SERPL-MCNC: 107 MG/DL (ref 65–140)
HBA1C MFR BLD: 6 %
HCV AB SER QL: NORMAL
HDLC SERPL-MCNC: 34 MG/DL
LDLC SERPL CALC-MCNC: 119 MG/DL (ref 0–100)
POTASSIUM SERPL-SCNC: 4.1 MMOL/L (ref 3.5–5.3)
SODIUM SERPL-SCNC: 139 MMOL/L (ref 136–145)
TRIGL SERPL-MCNC: 73 MG/DL

## 2022-04-23 PROCEDURE — 36415 COLL VENOUS BLD VENIPUNCTURE: CPT

## 2022-04-23 PROCEDURE — 86803 HEPATITIS C AB TEST: CPT

## 2022-04-23 PROCEDURE — 80061 LIPID PANEL: CPT

## 2022-04-23 PROCEDURE — 83036 HEMOGLOBIN GLYCOSYLATED A1C: CPT

## 2022-04-23 PROCEDURE — 80048 BASIC METABOLIC PNL TOTAL CA: CPT

## 2022-04-27 DIAGNOSIS — E78.00 HYPERCHOLESTEROLEMIA: ICD-10-CM

## 2022-04-27 DIAGNOSIS — R73.03 PREDIABETES: Primary | ICD-10-CM

## 2022-04-27 DIAGNOSIS — Z13.0 SCREENING FOR DEFICIENCY ANEMIA: ICD-10-CM

## 2022-10-29 ENCOUNTER — AMB VIDEO VISIT (OUTPATIENT)
Dept: OTHER | Facility: HOSPITAL | Age: 42
End: 2022-10-29

## 2022-10-29 DIAGNOSIS — I10 ESSENTIAL HYPERTENSION: ICD-10-CM

## 2022-10-29 DIAGNOSIS — U07.1 COVID: Primary | ICD-10-CM

## 2022-10-29 PROBLEM — F41.9 ANXIETY: Status: RESOLVED | Noted: 2021-02-23 | Resolved: 2022-10-29

## 2022-10-29 PROBLEM — F43.21 GRIEF REACTION: Status: RESOLVED | Noted: 2022-04-22 | Resolved: 2022-10-29

## 2022-10-29 PROBLEM — F43.20 GRIEF REACTION: Status: RESOLVED | Noted: 2022-04-22 | Resolved: 2022-10-29

## 2022-10-29 PROBLEM — R31.9 HEMATURIA: Status: RESOLVED | Noted: 2019-05-30 | Resolved: 2022-10-29

## 2022-10-29 RX ORDER — NIRMATRELVIR AND RITONAVIR 300-100 MG
3 KIT ORAL 2 TIMES DAILY
Qty: 30 TABLET | Refills: 0 | Status: SHIPPED | OUTPATIENT
Start: 2022-10-29 | End: 2022-11-03

## 2022-10-29 NOTE — LETTER
[facility]  [address] [address]    October 29, 2022     Patient: Don Fierro   YOB: 1980   Date of Visit: 10/29/2022       To Whom it May Concern:    Don Fierro was seen and evaluated virtually on 10/29/2022 and is being tested for COVID/Flu  Please note if COVID and Flu tests are negative, He may return to work when fever free for 24 hours without the use of a fever reducing agent  If COVID or Flu test is positive, He may return on 11/3/22 if fever free for 24 hours, as this is 5 days from the onset of symptoms  Upon return, He must then adhere to strict masking for an additional 5 days  If you have any questions or concerns, please don't hesitate to call           Sincerely,          Francia Farris PA-C        CC: No Recipients

## 2022-10-29 NOTE — PROGRESS NOTES
Video Visit - Yani Marquis 43 y o  male MRN: [id number]    REQUIRED DOCUMENTATION:         1  [facility]  2  Provider located at [address] [place] [id number] [phone]  3  [facility] provider: Minh Jasso PA-C   4  Identify all parties in room with patient during [facility] visit:  significant other-permission granted  5  After connecting through [employer], patient was identified by name and date of birth  Patient was then informed that this was a Telemedicine visit and that the exam was being conducted confidentially over secure lines  My office door was closed  No one else was in the room  Patient acknowledged consent and understanding of privacy and security of the Telemedicine visit  I informed the patient that I have reviewed their record in Epic and presented the opportunity for them to ask any questions regarding the visit today  The patient agreed to participate  VITALS: Heart Rate: 102 BPM, Respiratory Rate: 16 RPM, Temperature 98 5° F, Blood Pressure 143/92 mmHg, Pulse Ox 98 % on RA    HPI   Pt with hx HTN, HLD, obesity reports yesterday started feeling unwell, had fever tmax 100 6, congestion, HA  Has COVID  Ibuprofen and mucinex, afrin with some relief of sx  Vaccinated x 3  No CP or SOB  Interested in antiviral meds  Physical Exam  Constitutional:       General: He is not in acute distress  Appearance: Normal appearance  He is obese  He is not toxic-appearing  HENT:      Head: Normocephalic and atraumatic  Nose: No rhinorrhea  Mouth/Throat:      Mouth: Mucous membranes are moist    Eyes:      Conjunctiva/sclera: Conjunctivae normal       Comments: glasses   Pulmonary:      Effort: Pulmonary effort is normal  No respiratory distress  Breath sounds: No wheezing (no gross audible wheeze through computer)  Comments: 93% with exertion  Musculoskeletal:      Cervical back: Normal range of motion  Skin:     Findings: No rash (on face or neck)  Neurological:      Mental Status: He is alert  Cranial Nerves: No dysarthria or facial asymmetry  Psychiatric:         Mood and Affect: Mood normal          Behavior: Behavior normal          Diagnoses and all orders for this visit:    COVID  -     nirmatrelvir & ritonavir (Paxlovid, 300/100,) tablet therapy pack; Take 3 tablets by mouth 2 (two) times a day for 5 days Take 2 nirmatrelvir tablets + 1 ritonavir tablet together per dose      Patient Instructions   Please Be Courteous:  You have COVID-19  Day 0 is your first day of symptoms  Day 1 is the first full day after your symptoms started  You should isolate yourself away from other through day 5 since this is when you are likely most infectious  This means go home and stay home  In Your Home:  If you live with other people, trying to avoid common spaces and disinfect areas that you come into contact with  Per the CDC's recommendations: Use a separate bedroom and bathroom if feasible  If If other people in your home are concerned they may have covid, isolation should begin even before seeking testing and before test results become available  All household members should start wearing a mask in the home, particularly in shared spaces where appropriate distancing is not possible  Take Care of Yourself:  Schedule a virtual visit with your primary care physician as soon as possible  Try to sleep on your stomach as much as possible  If you have the ability to, take vitamin D3 2000 IU daily, vitamin-C 1000 mg twice a day, a multivitamin daily to help boost your immune system  You should check your temperature twice day  Go to the emergency department for any severe shortness of breath, chest pain or pulse ox less than 90%  Isolation: Everyone, regardless of vaccination status:     You may end isolation after day 5 if:  · You are fever-free for 24 hours (without the use of fever-reducing medication)    · Your symptoms are improving    If you still have fever or your other symptoms have not improved, continue to isolate until they improve  · If you had?moderate illness?(if you experienced shortness of breath or had difficulty breathing), or?severe illness?(you were hospitalized) due to COVID-19, or you have a weakened immune system, you need to isolate through day 10  · If you had?severe illness?or have a weakened immune system, consult your doctor before ending isolation  Ending isolation without a viral test may not be an option for you

## 2022-10-29 NOTE — CARE ANYWHERE EVISITS
Visit Summary for Valerio Denver Granville Days - Gender: Male - Date of Birth: [id number]  Date: [id number] - Duration: 18 minutes  Patient: Valerio Denver Gran[place] Days  Provider: Janina Martinez PA-C    Patient Contact Information  Address  [address] [place] [address]; [facility]   [phone]    Visit Topics  Headache [Added By: Self - 2022-10-29]  covid 19 [Added By: Self - 2022-10-29]  Cold [Added By: Self - 2022-10-29]  Fever [Added By: Self - 2022-10-29]    Triage Questions   What is your current physical address in the event of a medical emergency? Answer []  Are you allergic to any medications? Answer []  Are you now or could you be pregnant? Answer []  Do you have any immune system compromise or chronic lung   disease? Answer []  Do you have any vulnerable family members in the home (infant, pregnant, cancer, elderly)? Answer []     Conversation Transcripts  [0A][0A] [Notification] You are connected with Janina Martinez PA-C, Urgent Care Specialist [0A][Notification] Chase Brantley is located in [place]  [0A][Notification] Chase Brantley has shared health history  Laceycheryl Galindocait  [0A]    Diagnosis  COVID-19    Procedures  Value: [id number] Code: CPT-4 UNLISTED E&M SERVICE    Medications Prescribed    No prescriptions ordered    Electronically signed by: Janet Crespo(NPI [id number])

## 2022-10-29 NOTE — PATIENT INSTRUCTIONS
Please Be Courteous:  You have COVID-19  Day 0 is your first day of symptoms  Day 1 is the first full day after your symptoms started  You should isolate yourself away from other through day 5 since this is when you are likely most infectious  This means go home and stay home  In Your Home:  If you live with other people, trying to avoid common spaces and disinfect areas that you come into contact with  Per the CDC's recommendations: Use a separate bedroom and bathroom if feasible  If If other people in your home are concerned they may have covid, isolation should begin even before seeking testing and before test results become available  All household members should start wearing a mask in the home, particularly in shared spaces where appropriate distancing is not possible  Take Care of Yourself:  Schedule a virtual visit with your primary care physician as soon as possible  Try to sleep on your stomach as much as possible  If you have the ability to, take vitamin D3 2000 IU daily, vitamin-C 1000 mg twice a day, a multivitamin daily to help boost your immune system  You should check your temperature twice day  Go to the emergency department for any severe shortness of breath, chest pain or pulse ox less than 90%  Isolation: Everyone, regardless of vaccination status: You may end isolation after day 5 if:  You are fever-free for 24 hours (without the use of fever-reducing medication)    Your symptoms are improving    If you still have fever or your other symptoms have not improved, continue to isolate until they improve  If you had?moderate illness?(if you experienced shortness of breath or had difficulty breathing), or?severe illness?(you were hospitalized) due to COVID-19, or you have a weakened immune system, you need to isolate through day 10  If you had?severe illness?or have a weakened immune system, consult your doctor before ending isolation   Ending isolation without a viral test may not be an option for you

## 2022-10-31 RX ORDER — LOSARTAN POTASSIUM 25 MG/1
25 TABLET ORAL DAILY
Qty: 90 TABLET | Refills: 0 | Status: SHIPPED | OUTPATIENT
Start: 2022-10-31

## 2022-10-31 RX ORDER — HYDROCHLOROTHIAZIDE 12.5 MG/1
12.5 TABLET ORAL DAILY
Qty: 90 TABLET | Refills: 0 | Status: SHIPPED | OUTPATIENT
Start: 2022-10-31

## 2022-10-31 RX ORDER — LOSARTAN POTASSIUM 50 MG/1
50 TABLET ORAL DAILY
Qty: 90 TABLET | Refills: 0 | Status: SHIPPED | OUTPATIENT
Start: 2022-10-31

## 2022-11-16 ENCOUNTER — OFFICE VISIT (OUTPATIENT)
Dept: INTERNAL MEDICINE CLINIC | Facility: OTHER | Age: 42
End: 2022-11-16

## 2022-11-16 VITALS
OXYGEN SATURATION: 98 % | SYSTOLIC BLOOD PRESSURE: 132 MMHG | HEIGHT: 72 IN | TEMPERATURE: 98.9 F | HEART RATE: 100 BPM | BODY MASS INDEX: 37.06 KG/M2 | DIASTOLIC BLOOD PRESSURE: 86 MMHG | WEIGHT: 273.6 LBS

## 2022-11-16 DIAGNOSIS — E66.01 CLASS 2 SEVERE OBESITY DUE TO EXCESS CALORIES WITH SERIOUS COMORBIDITY AND BODY MASS INDEX (BMI) OF 35.0 TO 35.9 IN ADULT (HCC): ICD-10-CM

## 2022-11-16 DIAGNOSIS — R73.01 IMPAIRED FASTING GLUCOSE: Primary | ICD-10-CM

## 2022-11-16 DIAGNOSIS — I10 ESSENTIAL HYPERTENSION: ICD-10-CM

## 2022-11-16 DIAGNOSIS — E78.00 HYPERCHOLESTEROLEMIA: ICD-10-CM

## 2022-11-16 NOTE — ASSESSMENT & PLAN NOTE
- discussed weight with patient and importance routine exercise and healthy diet  - continue to monitor

## 2022-11-16 NOTE — ASSESSMENT & PLAN NOTE
- discussed dietary modifications and importance of exercise  - recently cut soda out of his diet  - previous hemoglobin A1c 6 0  - update hemoglobin A1c and fasting glucose

## 2022-11-16 NOTE — PROGRESS NOTES
Assessment/Plan:    Problem List Items Addressed This Visit        Endocrine    Impaired fasting glucose - Primary     - discussed dietary modifications and importance of exercise  - recently cut soda out of his diet  - previous hemoglobin A1c 6 0  - update hemoglobin A1c and fasting glucose            Cardiovascular and Mediastinum    Essential hypertension     - controlled on losartan 75 mg daily and hydrochlorothiazide 12 5 mg            Other    Class 2 severe obesity due to excess calories with serious comorbidity and body mass index (BMI) of 35 0 to 35 9 in adult ([facility] [address] )     - discussed weight with patient and importance routine exercise and healthy diet  - continue to monitor         Hypercholesterolemia     - continue 10 mg daily  - update lipid panel          Patient will go for labs in the next 1-2 weeks, will message him to review and then order labs for 6 months from now    BMI Counseling: Body mass index is 37 11 kg/m²  Discussed the patient's BMI with him  The BMI is above normal  Nutrition recommendations include 3-5 servings of fruits/vegetables daily, moderation in carbohydrate intake, increasing intake of lean protein and reducing intake of saturated fat and trans fat  Exercise recommendations include moderate aerobic physical activity for 150 minutes/week  M*Modal software was used to dictate this note  It may contain errors with dictating incorrect words or incorrect spelling  Please contact the provider directly with any questions  Subjective:      Patient ID: Maryetta Meigs is a 43 y o  male  HPI    Patient presents today for 6 month follow up  He had covid at the end of October and was treated with Paxlovid  He feels fully recovered, he does have a little bit of a lingering cough  He was off his simvastatin for 2 weeks while he was on the paxlovid so he did not want to go for his labs  HTN - he is monitoring his BP at home  He is on losartan 75 mg daily and HCTZ 12 5mg   He does monitor his BP at home  Home readings are 120s/70s  Mentally he is doing better after the loss of his father in January 2022 to [place]  The following portions of the patient's history were reviewed and updated as appropriate: allergies, current medications, past family history, past medical history, past social history, past surgical history and problem list     Review of Systems   Constitutional: Negative for activity change, appetite change, chills and fever  Respiratory: Positive for cough (lingering and improving)  Negative for chest tightness, shortness of breath and wheezing  Cardiovascular: Negative for chest pain, palpitations and leg swelling  Psychiatric/Behavioral: Negative for dysphoric mood  The patient is not nervous/anxious  Past Medical History:   Diagnosis Date   • Anxiety 2/23/2021   • Class 1 obesity due to excess calories without serious comorbidity with body mass index (BMI) of 34 0 to 34 9 in adult 9/25/2018   • Essential hypertension 11/6/2018   • Grief reaction 4/22/2022   • Hematuria 5/30/2019   • Kidney stone    • Kidney stone 5/30/2019   • Pneumonia          Current Outpatient Medications:   •  hydrochlorothiazide (HYDRODIURIL) 12 5 mg tablet, Take 1 tablet (12 5 mg total) by mouth daily, Disp: 90 tablet, Rfl: 0  •  losartan (COZAAR) 25 mg tablet, Take 1 tablet (25 mg total) by mouth daily, Disp: 90 tablet, Rfl: 0  •  losartan (COZAAR) 50 mg tablet, Take 1 tablet (50 mg total) by mouth daily, Disp: 90 tablet, Rfl: 0  •  simvastatin (ZOCOR) 10 mg tablet, Take 1 tablet (10 mg total) by mouth daily at bedtime, Disp: 90 tablet, Rfl: 1    Allergies   Allergen Reactions   • Penicillins Rash     Annotation - [id number]: HOwever he has been treated with Amoxicllin for sinusities with no problems         Social History   Past Surgical History:   Procedure Laterality Date   • NO PAST SURGERIES     • WISDOM TOOTH EXTRACTION     • WRIST SURGERY       Family History   Problem Relation Age of Onset   • Hypertension Mother    • Diabetes Father    • Hypertension Father    • Rheumatic fever Father    • Alcohol abuse Paternal Grandfather    • Heart failure Paternal Grandfather    • Substance Abuse Paternal Grandfather    • Diabetes Family        Objective:  /86 (BP Location: Left arm, Patient Position: Sitting, Cuff Size: Large)   Pulse 100   Temp 98 9 °F (37 2 °C) (Temporal)   Ht 6' (1 829 m)   Wt 124 kg (273 lb 9 6 oz)   SpO2 98% Comment: room air  BMI 37 11 kg/m²      Physical Exam  Vitals reviewed  Constitutional:       General: He is not in acute distress  Appearance: Normal appearance  He is obese  He is not diaphoretic  HENT:      Head: Normocephalic and atraumatic  Right Ear: Tympanic membrane and external ear normal       Left Ear: Tympanic membrane and external ear normal    Eyes:      Extraocular Movements: Extraocular movements intact  Conjunctiva/sclera: Conjunctivae normal       Pupils: Pupils are equal, round, and reactive to light  Neck:      Vascular: No carotid bruit  Cardiovascular:      Rate and Rhythm: Normal rate and regular rhythm  Heart sounds: Normal heart sounds  No murmur heard  Pulmonary:      Effort: Pulmonary effort is normal  No respiratory distress  Breath sounds: Normal breath sounds  No wheezing, rhonchi or rales  Musculoskeletal:      Right lower leg: No edema  Left lower leg: No edema  Neurological:      Mental Status: He is alert and oriented to person, place, and time  Mental status is at baseline  Psychiatric:         Mood and Affect: Mood normal          Behavior: Behavior normal          Thought Content:  Thought content normal          Judgment: Judgment normal

## 2023-01-30 DIAGNOSIS — I10 ESSENTIAL HYPERTENSION: ICD-10-CM

## 2023-01-30 DIAGNOSIS — E78.00 HYPERCHOLESTEROLEMIA: ICD-10-CM

## 2023-01-30 RX ORDER — HYDROCHLOROTHIAZIDE 12.5 MG/1
12.5 TABLET ORAL DAILY
Qty: 90 TABLET | Refills: 1 | Status: SHIPPED | OUTPATIENT
Start: 2023-01-30

## 2023-01-30 RX ORDER — SIMVASTATIN 10 MG
10 TABLET ORAL
Qty: 90 TABLET | Refills: 1 | Status: SHIPPED | OUTPATIENT
Start: 2023-01-30

## 2023-01-30 RX ORDER — LOSARTAN POTASSIUM 25 MG/1
25 TABLET ORAL DAILY
Qty: 90 TABLET | Refills: 1 | Status: SHIPPED | OUTPATIENT
Start: 2023-01-30

## 2023-01-30 RX ORDER — LOSARTAN POTASSIUM 50 MG/1
50 TABLET ORAL DAILY
Qty: 90 TABLET | Refills: 1 | Status: SHIPPED | OUTPATIENT
Start: 2023-01-30

## 2023-03-27 ENCOUNTER — OFFICE VISIT (OUTPATIENT)
Dept: INTERNAL MEDICINE CLINIC | Facility: CLINIC | Age: 43
End: 2023-03-27

## 2023-03-27 VITALS
SYSTOLIC BLOOD PRESSURE: 158 MMHG | OXYGEN SATURATION: 95 % | TEMPERATURE: 98.5 F | BODY MASS INDEX: 36.44 KG/M2 | HEIGHT: 72 IN | WEIGHT: 269 LBS | DIASTOLIC BLOOD PRESSURE: 98 MMHG | HEART RATE: 108 BPM

## 2023-03-27 DIAGNOSIS — N39.0 URINARY TRACT INFECTION WITHOUT HEMATURIA, SITE UNSPECIFIED: ICD-10-CM

## 2023-03-27 DIAGNOSIS — R35.0 FREQUENCY OF URINATION: Primary | ICD-10-CM

## 2023-03-27 LAB
BILIRUB UR QL STRIP: NEGATIVE
CLARITY UR: CLEAR
COLOR UR: COLORLESS
GLUCOSE UR STRIP-MCNC: NEGATIVE MG/DL
HGB UR QL STRIP.AUTO: NEGATIVE
KETONES UR STRIP-MCNC: NEGATIVE MG/DL
LEUKOCYTE ESTERASE UR QL STRIP: NEGATIVE
NITRITE UR QL STRIP: NEGATIVE
PH UR STRIP.AUTO: 6 [PH]
PROT UR STRIP-MCNC: NEGATIVE MG/DL
SL AMB  POCT GLUCOSE, UA: NORMAL
SL AMB LEUKOCYTE ESTERASE,UA: NORMAL
SL AMB POCT BILIRUBIN,UA: NORMAL
SL AMB POCT BLOOD,UA: NORMAL
SL AMB POCT CLARITY,UA: CLEAR
SL AMB POCT COLOR,UA: YELLOW
SL AMB POCT KETONES,UA: NORMAL
SL AMB POCT NITRITE,UA: NORMAL
SL AMB POCT PH,UA: 6
SL AMB POCT SPECIFIC GRAVITY,UA: 1.02
SL AMB POCT URINE PROTEIN: NORMAL
SL AMB POCT UROBILINOGEN: NORMAL
SP GR UR STRIP.AUTO: 1.01 (ref 1–1.03)
UROBILINOGEN UR STRIP-ACNC: <2 MG/DL

## 2023-03-27 RX ORDER — CIPROFLOXACIN 500 MG/1
500 TABLET, FILM COATED ORAL EVERY 12 HOURS SCHEDULED
Qty: 10 TABLET | Refills: 0 | Status: SHIPPED | OUTPATIENT
Start: 2023-03-27 | End: 2023-04-01

## 2023-03-27 NOTE — PATIENT INSTRUCTIONS
Urinary Tract Infection in Men   AMBULATORY CARE:   A urinary tract infection (UTI)  is caused by bacteria that get inside your urinary tract  Your urinary tract includes your kidneys, ureters, bladder, and urethra  A UTI is more common in your lower urinary tract, which includes your bladder and urethra  Common symptoms include the following:   Urinating more often than usual, leaking urine, or waking from sleep to urinate    Pain or burning when you urinate    Pain or pressure in your lower abdomen or back    Urine that smells bad    Blood in your urine    Seek care immediately if:   You are urinating very little or not at all  You have a high fever with shaking chills  You have side or back pain that gets worse  Call your doctor if:   You have a fever  You do not feel better after 2 days of taking antibiotics  You are vomiting  You have new symptoms, such as blood or pus in your urine  You have questions or concerns about your condition or care  Treatment for a UTI  may include medicines to treat a bacterial infection  You may also need medicines to decrease pain and burning, or decrease the urge to urinate often  Prevent a UTI:   Empty your bladder often  Urinate and empty your bladder as soon as you feel the need  Do not hold your urine for long periods of time  Drink liquids as directed  Ask how much liquid to drink each day and which liquids are best for you  You may need to drink more liquids than usual to help flush out the bacteria  Do not drink alcohol, caffeine, or citrus juices  These can irritate your bladder and increase your symptoms  Your healthcare provider may recommend cranberry juice to help prevent a UTI  Urinate after you have sex  This can help flush out bacteria passed during sex  Do pelvic muscle exercises often  Pelvic muscle exercises may help you start and stop urinating  Strong pelvic muscles may help you empty your bladder easier   Squeeze these muscles tightly for 5 seconds like you are trying to hold back urine  Then relax for 5 seconds  Gradually work up to squeezing for 10 seconds  Do 3 sets of 15 repetitions a day, or as directed  Follow up with your doctor as directed:  Write down your questions so you remember to ask them during your visits  © Copyright [employer] 2022 Information is for End User's use only and may not be sold, redistributed or otherwise used for commercial purposes  The above information is an  only  It is not intended as medical advice for individual conditions or treatments  Talk to your doctor, nurse or pharmacist before following any medical regimen to see if it is safe and effective for you

## 2023-03-27 NOTE — PROGRESS NOTES
Assessment/Plan:    Diagnoses and all orders for this visit:    Frequency of urination  -     POCT urine dip    Urinary tract infection without hematuria, site unspecified  -     ciprofloxacin (CIPRO) 500 mg tablet; Take 1 tablet (500 mg total) by mouth every 12 (twelve) hours for 5 days         Office UA positive for leukocyte esterase, negative nitrates/blood  Given tachycardia, will empirically treat with antibiotics, will send urine for culture  Patient agreeable to the above plan  Depression Screening and Follow-up Plan: Patient was screened for depression during today's encounter  They screened negative with a PHQ-2 score of 0  Patient Instructions     Urinary Tract Infection in Men   AMBULATORY CARE:   A urinary tract infection (UTI)  is caused by bacteria that get inside your urinary tract  Your urinary tract includes your kidneys, ureters, bladder, and urethra  A UTI is more common in your lower urinary tract, which includes your bladder and urethra  Common symptoms include the following:   • Urinating more often than usual, leaking urine, or waking from sleep to urinate    • Pain or burning when you urinate    • Pain or pressure in your lower abdomen or back    • Urine that smells bad    • Blood in your urine    Seek care immediately if:   • You are urinating very little or not at all  • You have a high fever with shaking chills  • You have side or back pain that gets worse  Call your doctor if:   • You have a fever  • You do not feel better after 2 days of taking antibiotics  • You are vomiting  • You have new symptoms, such as blood or pus in your urine  • You have questions or concerns about your condition or care  Treatment for a UTI  may include medicines to treat a bacterial infection  You may also need medicines to decrease pain and burning, or decrease the urge to urinate often  Prevent a UTI:   • Empty your bladder often    Urinate and empty your bladder as soon as you feel the need  Do not hold your urine for long periods of time  • Drink liquids as directed  Ask how much liquid to drink each day and which liquids are best for you  You may need to drink more liquids than usual to help flush out the bacteria  Do not drink alcohol, caffeine, or citrus juices  These can irritate your bladder and increase your symptoms  Your healthcare provider may recommend cranberry juice to help prevent a UTI  • Urinate after you have sex  This can help flush out bacteria passed during sex  • Do pelvic muscle exercises often  Pelvic muscle exercises may help you start and stop urinating  Strong pelvic muscles may help you empty your bladder easier  Squeeze these muscles tightly for 5 seconds like you are trying to hold back urine  Then relax for 5 seconds  Gradually work up to squeezing for 10 seconds  Do 3 sets of 15 repetitions a day, or as directed  Follow up with your doctor as directed:  Write down your questions so you remember to ask them during your visits  © Copyright [employer] 2022 Information is for End User's use only and may not be sold, redistributed or otherwise used for commercial purposes  The above information is an  only  It is not intended as medical advice for individual conditions or treatments  Talk to your doctor, nurse or pharmacist before following any medical regimen to see if it is safe and effective for you  Subjective:      Patient ID: Emily Boswell is a 37 y o  male    Patient complains of frequency and burning of urine with some lower abdominal pain last Wednesday, symptoms spontaneously improved in 2 days  Reports a low-grade fever  Currently does not have any symptoms  No history of hematuria  History of spontaneously passed kidney stones 4 years ago          Current Outpatient Medications:   •  ciprofloxacin (CIPRO) 500 mg tablet, Take 1 tablet (500 mg total) by mouth every 12 (twelve) hours for 5 days, Disp: 10 tablet, Rfl: 0  •  hydrochlorothiazide (HYDRODIURIL) 12 5 mg tablet, Take 1 tablet (12 5 mg total) by mouth daily, Disp: 90 tablet, Rfl: 1  •  losartan (COZAAR) 25 mg tablet, Take 1 tablet (25 mg total) by mouth daily, Disp: 90 tablet, Rfl: 1  •  losartan (COZAAR) 50 mg tablet, Take 1 tablet (50 mg total) by mouth daily, Disp: 90 tablet, Rfl: 1  •  simvastatin (ZOCOR) 10 mg tablet, Take 1 tablet (10 mg total) by mouth daily at bedtime, Disp: 90 tablet, Rfl: 1     Past Medical History:   Diagnosis Date   • Anxiety 2/23/2021   • Class 1 obesity due to excess calories without serious comorbidity with body mass index (BMI) of 34 0 to 34 9 in adult 9/25/2018   • Essential hypertension 11/6/2018   • Grief reaction 4/22/2022   • Hematuria 5/30/2019   • Kidney stone    • Kidney stone 5/30/2019   • Pneumonia          Past Surgical History:   Procedure Laterality Date   • NO PAST SURGERIES     • WISDOM TOOTH EXTRACTION     • WRIST SURGERY           Allergies   Allergen Reactions   • Penicillins Rash     Annotation - [id number]: Octaviano Jimenez he has been treated with Amoxicllin for sinusities with no problems  Recent Results (from the past 1008 hour(s))   POCT urine dip    Collection Time: 03/27/23 11:32 AM   Result Value Ref Range    LEUKOCYTE ESTERASE,UA 15leu/ul  NITRITE,UA neg     SL AMB POCT UROBILINOGEN 0 2mg/dl     POCT URINE PROTEIN neg      PH,UA 6 0     BLOOD,UA neg     SPECIFIC GRAVITY,UA 1 025     KETONES,UA 5mg/dl     BILIRUBIN,UA neg     GLUCOSE, UA neg      COLOR,UA yellow     CLARITY,UA clear        The following portions of the patient's history were reviewed and updated as appropriate: allergies, current medications, past family history, past medical history, past social history, past surgical history and problem list      Review of Systems   Constitutional: Negative for activity change, appetite change, chills, diaphoresis, fatigue, fever and unexpected weight change     HENT: Negative for congestion and sore throat  Respiratory: Negative for apnea, cough, choking, chest tightness, shortness of breath, wheezing and stridor  Cardiovascular: Negative for chest pain, palpitations and leg swelling  Gastrointestinal: Negative for abdominal distention, abdominal pain, blood in stool, constipation, nausea and rectal pain  Genitourinary: Positive for dysuria and frequency  Negative for flank pain and urgency  Musculoskeletal: Negative for arthralgias, back pain, gait problem, joint swelling and myalgias  Skin: Negative for color change, pallor and rash  Neurological: Negative for headaches  Objective:      Vitals:    03/27/23 1114   BP: 158/98   Pulse: (!) 108   Temp: 98 5 °F (36 9 °C)   SpO2: 95%          Physical Exam  Vitals reviewed  Constitutional:       General: He is not in acute distress  Appearance: Normal appearance  He is not ill-appearing, toxic-appearing or diaphoretic  HENT:      Mouth/Throat:      Mouth: Mucous membranes are moist    Cardiovascular:      Rate and Rhythm: Normal rate and regular rhythm  Pulses: Normal pulses  Heart sounds: Normal heart sounds  No murmur heard  No friction rub  No gallop  Pulmonary:      Effort: Pulmonary effort is normal  No respiratory distress  Breath sounds: Normal breath sounds  No stridor  No wheezing, rhonchi or rales  Chest:      Chest wall: No tenderness  Abdominal:      General: There is no distension  Palpations: Abdomen is soft  Tenderness: There is no abdominal tenderness  There is no right CVA tenderness or left CVA tenderness  Musculoskeletal:      Right lower leg: No edema  Left lower leg: No edema  Skin:     General: Skin is warm and dry  Findings: No lesion or rash  Neurological:      Mental Status: He is alert

## 2023-05-15 DIAGNOSIS — Z13.0 SCREENING FOR IRON DEFICIENCY ANEMIA: ICD-10-CM

## 2023-05-15 DIAGNOSIS — E78.00 HYPERCHOLESTEREMIA: ICD-10-CM

## 2023-05-15 DIAGNOSIS — R73.03 PREDIABETES: Primary | ICD-10-CM

## 2023-05-16 ENCOUNTER — APPOINTMENT (OUTPATIENT)
Dept: LAB | Facility: IMAGING CENTER | Age: 43
End: 2023-05-16

## 2023-05-16 DIAGNOSIS — Z13.0 SCREENING FOR IRON DEFICIENCY ANEMIA: ICD-10-CM

## 2023-05-16 DIAGNOSIS — R73.03 PREDIABETES: ICD-10-CM

## 2023-05-16 DIAGNOSIS — E78.00 HYPERCHOLESTEREMIA: ICD-10-CM

## 2023-05-16 LAB
ALBUMIN SERPL BCP-MCNC: 4 G/DL (ref 3.5–5)
ALP SERPL-CCNC: 88 U/L (ref 46–116)
ALT SERPL W P-5'-P-CCNC: 48 U/L (ref 12–78)
ANION GAP SERPL CALCULATED.3IONS-SCNC: 0 MMOL/L (ref 4–13)
AST SERPL W P-5'-P-CCNC: 16 U/L (ref 5–45)
BASOPHILS # BLD AUTO: 0.05 THOUSANDS/ÂΜL (ref 0–0.1)
BASOPHILS NFR BLD AUTO: 1 % (ref 0–1)
BILIRUB SERPL-MCNC: 0.4 MG/DL (ref 0.2–1)
BUN SERPL-MCNC: 11 MG/DL (ref 5–25)
CALCIUM SERPL-MCNC: 9.1 MG/DL (ref 8.3–10.1)
CHLORIDE SERPL-SCNC: 106 MMOL/L (ref 96–108)
CHOLEST SERPL-MCNC: 167 MG/DL
CO2 SERPL-SCNC: 30 MMOL/L (ref 21–32)
CREAT SERPL-MCNC: 0.87 MG/DL (ref 0.6–1.3)
EOSINOPHIL # BLD AUTO: 0.2 THOUSAND/ÂΜL (ref 0–0.61)
EOSINOPHIL NFR BLD AUTO: 3 % (ref 0–6)
ERYTHROCYTE [DISTWIDTH] IN BLOOD BY AUTOMATED COUNT: 13.8 % (ref 11.6–15.1)
EST. AVERAGE GLUCOSE BLD GHB EST-MCNC: 117 MG/DL
GFR SERPL CREATININE-BSD FRML MDRD: 105 ML/MIN/1.73SQ M
GLUCOSE P FAST SERPL-MCNC: 105 MG/DL (ref 65–99)
HBA1C MFR BLD: 5.7 %
HCT VFR BLD AUTO: 46.8 % (ref 36.5–49.3)
HDLC SERPL-MCNC: 36 MG/DL
HGB BLD-MCNC: 14.7 G/DL (ref 12–17)
IMM GRANULOCYTES # BLD AUTO: 0.04 THOUSAND/UL (ref 0–0.2)
IMM GRANULOCYTES NFR BLD AUTO: 1 % (ref 0–2)
LDLC SERPL CALC-MCNC: 121 MG/DL (ref 0–100)
LYMPHOCYTES # BLD AUTO: 2.59 THOUSANDS/ÂΜL (ref 0.6–4.47)
LYMPHOCYTES NFR BLD AUTO: 39 % (ref 14–44)
MCH RBC QN AUTO: 28.3 PG (ref 26.8–34.3)
MCHC RBC AUTO-ENTMCNC: 31.4 G/DL (ref 31.4–37.4)
MCV RBC AUTO: 90 FL (ref 82–98)
MONOCYTES # BLD AUTO: 0.57 THOUSAND/ÂΜL (ref 0.17–1.22)
MONOCYTES NFR BLD AUTO: 9 % (ref 4–12)
NEUTROPHILS # BLD AUTO: 3.27 THOUSANDS/ÂΜL (ref 1.85–7.62)
NEUTS SEG NFR BLD AUTO: 47 % (ref 43–75)
NRBC BLD AUTO-RTO: 0 /100 WBCS
PLATELET # BLD AUTO: 330 THOUSANDS/UL (ref 149–390)
PMV BLD AUTO: 10.2 FL (ref 8.9–12.7)
POTASSIUM SERPL-SCNC: 4.1 MMOL/L (ref 3.5–5.3)
PROT SERPL-MCNC: 7.7 G/DL (ref 6.4–8.4)
RBC # BLD AUTO: 5.2 MILLION/UL (ref 3.88–5.62)
SODIUM SERPL-SCNC: 136 MMOL/L (ref 135–147)
TRIGL SERPL-MCNC: 52 MG/DL
WBC # BLD AUTO: 6.72 THOUSAND/UL (ref 4.31–10.16)

## 2023-05-19 ENCOUNTER — OFFICE VISIT (OUTPATIENT)
Dept: INTERNAL MEDICINE CLINIC | Facility: OTHER | Age: 43
End: 2023-05-19

## 2023-05-19 VITALS
HEIGHT: 72 IN | SYSTOLIC BLOOD PRESSURE: 132 MMHG | WEIGHT: 269 LBS | DIASTOLIC BLOOD PRESSURE: 96 MMHG | HEART RATE: 95 BPM | BODY MASS INDEX: 36.44 KG/M2 | TEMPERATURE: 97.1 F | OXYGEN SATURATION: 97 %

## 2023-05-19 DIAGNOSIS — I10 ESSENTIAL HYPERTENSION: Primary | ICD-10-CM

## 2023-05-19 DIAGNOSIS — R73.03 PREDIABETES: ICD-10-CM

## 2023-05-19 DIAGNOSIS — E78.00 HYPERCHOLESTEROLEMIA: ICD-10-CM

## 2023-05-19 PROBLEM — R73.01 IMPAIRED FASTING GLUCOSE: Status: RESOLVED | Noted: 2021-09-17 | Resolved: 2023-05-19

## 2023-05-19 RX ORDER — HYDROCHLOROTHIAZIDE 12.5 MG/1
12.5 TABLET ORAL DAILY
Qty: 90 TABLET | Refills: 1 | Status: CANCELLED | OUTPATIENT
Start: 2023-05-19

## 2023-05-19 RX ORDER — LOSARTAN POTASSIUM 50 MG/1
50 TABLET ORAL DAILY
Qty: 90 TABLET | Refills: 1 | Status: CANCELLED | OUTPATIENT
Start: 2023-05-19

## 2023-05-19 RX ORDER — LOSARTAN POTASSIUM AND HYDROCHLOROTHIAZIDE 12.5; 1 MG/1; MG/1
1 TABLET ORAL DAILY
Qty: 90 TABLET | Refills: 1 | Status: SHIPPED | OUTPATIENT
Start: 2023-05-19

## 2023-05-19 RX ORDER — SIMVASTATIN 10 MG
10 TABLET ORAL
Qty: 90 TABLET | Refills: 1 | Status: SHIPPED | OUTPATIENT
Start: 2023-05-19

## 2023-05-19 RX ORDER — LOSARTAN POTASSIUM 25 MG/1
25 TABLET ORAL DAILY
Qty: 90 TABLET | Refills: 1 | Status: CANCELLED | OUTPATIENT
Start: 2023-05-19

## 2023-05-19 NOTE — PROGRESS NOTES
Assessment/Plan:    Problem List Items Addressed This Visit        Cardiovascular and Mediastinum    Essential hypertension - Primary     -Above goal  -Increase losartan to 100 mg daily, continue hydrochlorothiazide 12 5 mg daily  -Continue monitoring blood pressure at home  -Continue low-sodium diet, routine exercise and weight loss          Relevant Medications    losartan-hydrochlorothiazide (HYZAAR) 100-12 5 MG per tablet       Other    Hypercholesterolemia     - Lipid panel at goal  -Continue simvastatin 10 mg daily         Relevant Medications    simvastatin (ZOCOR) 10 mg tablet    Other Relevant Orders    Lipid Panel with Direct LDL reflex    Basic metabolic panel    Prediabetes     - Hemoglobin A1c 5 7 (improved from 6 0)  -Discussed healthy diet and routine exercise         Relevant Orders    Hemoglobin T9G    Basic metabolic panel       BMI Counseling: Body mass index is 36 48 kg/m²  Discussed the patient's BMI with him  The BMI is above normal  Nutrition recommendations include 3-5 servings of fruits/vegetables daily, moderation in carbohydrate intake, increasing intake of lean protein and reducing intake of saturated fat and trans fat  Exercise recommendations include moderate aerobic physical activity for 150 minutes/week  M*Modal software was used to dictate this note  It may contain errors with dictating incorrect words or incorrect spelling  Please contact the provider directly with any questions  Subjective:      Patient ID: Awa Choi is a 37 y o  male  HPI     Patient presents today for 6-month follow-up  Labs completed 5/16/2023  CBC and CMP unremarkable with the exception of mildly elevated glucose    Prediabetes -Hemoglobin A1c 5 7 (improved from 6 0), fasting glucose 105    HLD - total cholesterol 167, triglycerides 52, HDL 36,   He is currently on simvastatin 10 mg daily     The 10-year ASCVD risk score (Maricruz CANO, et al , 2019) is: 2 2%    Values used to calculate the score:      Age: 37 years      Sex: Male      Is Non- : No      Diabetic: No      Tobacco smoker: No      Systolic Blood Pressure: 873 mmHg      Is BP treated: Yes      HDL Cholesterol: 36 mg/dL      Total Cholesterol: 167 mg/dL    Hypertension-currently on losartan 75 mg daily and hydrochlorothiazide 12 5 mg daily  He monitors his BP at home weekly, usually 130s/88  The following portions of the patient's history were reviewed and updated as appropriate: allergies, current medications, past family history, past medical history, past social history, past surgical history and problem list     Review of Systems   Constitutional: Negative for unexpected weight change  Eyes: Negative for visual disturbance  Respiratory: Negative for cough, chest tightness and shortness of breath  Cardiovascular: Negative for chest pain, palpitations and leg swelling  Neurological: Positive for headaches (occasional)  Past Medical History:   Diagnosis Date   • Anxiety 2/23/2021   • Class 1 obesity due to excess calories without serious comorbidity with body mass index (BMI) of 34 0 to 34 9 in adult 9/25/2018   • Essential hypertension 11/6/2018   • Grief reaction 4/22/2022   • Hematuria 5/30/2019   • Kidney stone    • Kidney stone 5/30/2019   • Pneumonia          Current Outpatient Medications:   •  losartan-hydrochlorothiazide (HYZAAR) 100-12 5 MG per tablet, Take 1 tablet by mouth daily, Disp: 90 tablet, Rfl: 1  •  simvastatin (ZOCOR) 10 mg tablet, Take 1 tablet (10 mg total) by mouth daily at bedtime, Disp: 90 tablet, Rfl: 1    Allergies   Allergen Reactions   • Penicillins Rash     Annotation - [id number]: HOwever he has been treated with Amoxicllin for sinusities with no problems         Social History   Past Surgical History:   Procedure Laterality Date   • NO PAST SURGERIES     • WISDOM TOOTH EXTRACTION     • WRIST SURGERY       Family History   Problem Relation Age of Onset   • Hypertension Mother    • Diabetes Father    • Hypertension Father    • Rheumatic fever Father    • Alcohol abuse Paternal Grandfather    • Heart failure Paternal Grandfather    • Substance Abuse Paternal Grandfather    • Diabetes Family        Objective:  /96 (BP Location: Left arm, Patient Position: Sitting, Cuff Size: Large)   Pulse 95   Temp (!) 97 1 °F (36 2 °C) (Temporal)   Ht 6' (1 829 m)   Wt 122 kg (269 lb)   SpO2 97%   BMI 36 48 kg/m²      Physical Exam  Vitals reviewed  Constitutional:       General: He is not in acute distress  Appearance: Normal appearance  He is obese  He is not diaphoretic  HENT:      Head: Normocephalic and atraumatic  Right Ear: Tympanic membrane and external ear normal       Left Ear: Tympanic membrane and external ear normal    Eyes:      Extraocular Movements: Extraocular movements intact  Conjunctiva/sclera: Conjunctivae normal       Pupils: Pupils are equal, round, and reactive to light  Cardiovascular:      Rate and Rhythm: Normal rate and regular rhythm  Heart sounds: Normal heart sounds  No murmur heard  Pulmonary:      Effort: Pulmonary effort is normal  No respiratory distress  Breath sounds: Normal breath sounds  No wheezing, rhonchi or rales  Musculoskeletal:      Right lower leg: No edema  Left lower leg: No edema  Neurological:      Mental Status: He is alert and oriented to person, place, and time  Mental status is at baseline  Psychiatric:         Mood and Affect: Mood normal          Behavior: Behavior normal          Thought Content:  Thought content normal          Judgment: Judgment normal

## 2023-05-19 NOTE — ASSESSMENT & PLAN NOTE
-Above goal  -Increase losartan to 100 mg daily, continue hydrochlorothiazide 12 5 mg daily  -Continue monitoring blood pressure at home  -Continue low-sodium diet, routine exercise and weight loss

## 2023-08-03 ENCOUNTER — AMB VIDEO VISIT (OUTPATIENT)
Dept: OTHER | Facility: HOSPITAL | Age: 43
End: 2023-08-03

## 2023-08-03 VITALS — DIASTOLIC BLOOD PRESSURE: 89 MMHG | TEMPERATURE: 100.5 F | SYSTOLIC BLOOD PRESSURE: 130 MMHG | OXYGEN SATURATION: 98 %

## 2023-08-03 DIAGNOSIS — U07.1 COVID: Primary | ICD-10-CM

## 2023-08-03 PROCEDURE — ECARE PR SL URGENT CARE VIRTUAL VISIT: Performed by: NURSE PRACTITIONER

## 2023-08-03 RX ORDER — NIRMATRELVIR AND RITONAVIR 300-100 MG
3 KIT ORAL 2 TIMES DAILY
Qty: 30 TABLET | Refills: 0 | Status: SHIPPED | OUTPATIENT
Start: 2023-08-03 | End: 2023-08-08

## 2023-08-03 NOTE — PROGRESS NOTES
Video Visit - Aracely Earl 37 y.o. male MRN: [id number]    REQUIRED DOCUMENTATION:         1. This service was provided via [employer]. 2. Provider located at [facility]  [address] [address] [phone]. 3. [employer] provider: JO Calixto. 4. Identify all parties in room with patient during [facility] visit:  Patient   5. After connecting through televideo, patient was identified by name and date of birth. Patient was then informed that this was a Telemedicine visit and that the exam was being conducted confidentially over secure lines. My office door was closed. No one else was in the room. Patient acknowledged consent and understanding of privacy and security of the Telemedicine visit. I informed the patient that I have reviewed their record in Epic and presented the opportunity for them to ask any questions regarding the visit today. The patient agreed to participate. This is a 37year old male here today for video visit. He started with fever, body aches and congestion. Symptoms started today. He denies cough. He is vaccinated for covid. NO chest pain or sob. He is eating and drinking okay. He had headaches yesterday. No nausea or vomiting. He would like to start paxlovid. He was on medication in past and tolerated well. Review of Systems   Constitutional: Positive for activity change, chills, fatigue and fever. HENT: Positive for congestion and rhinorrhea. Respiratory: Negative. Negative for cough, shortness of breath and wheezing. Cardiovascular: Negative. Negative for chest pain. Neurological: Positive for headaches. Psychiatric/Behavioral: Negative. Vitals:    08/03/23 1214   BP: 130/89   Temp: 100.5 °F (38.1 °C)   SpO2: 98%     Physical Exam  Constitutional:       General: He is not in acute distress. Appearance: Normal appearance. He is not ill-appearing. HENT:      Head: Normocephalic and atraumatic. Pulmonary:      Effort: Pulmonary effort is normal. No respiratory distress. Comments: No cough or audible wheezing. Able to speak in full sentencews. Skin:     Comments: No rash on head or neck. Neurological:      Mental Status: He is alert and oriented to person, place, and time. Psychiatric:         Mood and Affect: Mood normal.         Thought Content: Thought content normal.         Judgment: Judgment normal.       Diagnoses and all orders for this visit:    COVID  -     nirmatrelvir & ritonavir (Paxlovid, 300/100,) tablet therapy pack; Take 3 tablets by mouth 2 (two) times a day for 5 days Take 2 nirmatrelvir tablets + 1 ritonavir tablet together per dose      Patient Instructions        Home covid test positive. Patient would like to start paxlovid. Discussed side effects. Discussed he needs to d/c zocor for 10 days. Rest and drink extra fluids. Tylenol as needed for pain. Go to ER with any worsening symptoms, chest pain, sob, difficulty breathing, lethargy, confusion, dehyrdration, persistent fever 103 or higher. Please Be Courteous:  You are being tested for novel coronavirus (COVID-19) your test is pending at this time. You need to self quarantine at least until you have the results back. This means go home and stay home. Have someone else  your medications for you and bring them to you and drop them off at your door. Tests typically come back in 1-3 days. Results can be found in the "COVID-19" section of your [employer] account. In Your Home:  If you live with other people, trying to avoid common spaces and disinfect areas that you come into contact with. Per the CDC's recommendations: persons who suspect that they might have COVID-19 should isolate, stay at home, and use a separate bedroom and bathroom if feasible. Isolation should begin even before seeking testing and before test results become available.  All household members should start wearing a mask in the home, particularly in shared spaces where appropriate distancing is not possible. Take Care of Yourself:  Try to sleep on your stomach as much as possible. If you have the ability to, take vitamin D3 2000 IU by mouth daily, vitamin-C 1000 mg by mouth twice a day, a multivitamin daily to help boost your immune system. You should check your temperature twice day. Return to the emergency department for any severe shortness of breath or pulse ox less than 90%. If You Test Positive for COVID-19 (Isolate)     Everyone, regardless of vaccination status:     · Stay home for 5 days. · If you have no symptoms or your symptoms are resolving after 5 days, you can leave your house. · Continue to wear a mask around others for 5 additional days. If you have a fever, continue to stay home until your fever resolves. If You Were Exposed to Someone with COVID-19 (Quarantine)  If you:  Have been boosted  OR  Completed the primary series of Pfizer or Moderna vaccine within the last 6 months  OR  Completed the primary series of J&J vaccine within the last 2 months     · Wear a mask around others for 10 days. · Test on day 5, if possible. If you develop symptoms get a test and stay home. If you:  Completed the primary series of Pfizer or Moderna vaccine over 6 months ago and are not boosted  OR  Completed the primary series of J&J over 2 months ago and are not boosted  OR  Are unvaccinated     · Stay home for 5 days. After that continue to wear a mask around others for 5 additional days. · If you can’t quarantine you must wear a mask for 10 days. · Test on day 5 if possible.      If you develop symptoms get a test and stay home        CT SHEET FOR PATIENTS, PARENTS, AND CAREGIVERS  EMERGENCY USE AUTHORIZATION (EUA) OF PAXLOVID  FOR CORONAVIRUS DISEASE 2019 (COVID-19)  You are being given this Fact Sheet because your healthcare provider believes it is   necessary to provide you with PAXLOVID for the treatment of mild-to-moderate   coronavirus disease (COVID-19) caused by the SARS-CoV-2 virus. This Fact Sheet   contains information to help you understand the risks and benefits of taking the   PAXLOVID you have received or may receive. This Fact Sheet also contains   information about how to take PAXLOVID and how to report side effects or problems   with the appearance or packaging of PAXLOVID. The U.S. Food and Drug Administration (FDA) has issued an Emergency Use   Authorization (EUA) to make PAXLOVID available during the COVID-19 pandemic (for   more details about an EUA please see Jay Boykin is an Emergency Use Authorization?”   at the end of this document). Cherie Rajinder is not an FDA approved medicine in the   [facility]. Read this Fact Sheet for information about PAXLOVID. Talk to your   healthcare provider about your options or if you have any questions. It is your choice to   take PAXLOVID. What is COVID-19? COVID-19 is caused by a virus called a coronavirus. You can get COVID-19 through   close contact with another person who has the virus. COVID-19 illnesses have ranged from very mild-to-severe, including illness resulting in   death. While information so far suggests that most COVID-19 illness is mild, serious   illness can happen and may cause some of your other medical conditions to become   worse. Older people and people of all ages with severe, long lasting (chronic) medical   conditions like heart disease, lung disease, and diabetes, for example seem to be at   higher risk of being hospitalized for COVID-19. What is PAXLOVID? Cherie Rajinder is an investigational medicine used to treat adults and children [12 years of   age and older weighing at least 80 pounds (36 kg)] with a current diagnosis of   mild-to-moderate COVID-19 and who are at high risk for progression to severe COVID19, including hospitalization or death. PAXLOVID is investigational because it is still   being studied.  There is limited information about the safety and effectiveness of using   PAXLOVID to treat people with mild-to-moderate COVID-19. The FDA has authorized the emergency use of PAXLOVID for the treatment of adults   and children [15years of age and older weighing at least 80 pounds (36 kg)] with a   current diagnosis of mild-to-moderate COVID-19 and who are at high risk for   progression to severe COVID-19, including hospitalization or death, under an EUA. 2 Revised: 02/2023  What should I tell my healthcare provider before I take PAXLOVID? Tell your healthcare provider if you:  • Have any allergies  • Have liver or kidney disease   • Are pregnant or plan to become pregnant  • Are breastfeeding a child  • Have any serious illnesses  Some medicines may interact with PAXLOVID and may cause serious side   effects. • Tell your healthcare provider about all the medicines you take, including   prescription and over-the-counter medicines, vitamins, and herbal supplements. • Your healthcare provider can tell you if it is safe to take PAXLOVID with other   medicines. • You can ask your healthcare provider or pharmacist for a list of medicines that   interact with PAXLOVID. • Do not start taking a new medicine without telling your healthcare provider. Tell your healthcare provider if you are taking combined hormonal contraceptive. PAXLOVID may affect how your birth control pills work. Females who are able to   become pregnant should use another effective alternative form of contraception or an   additional barrier method of contraception. Talk to your healthcare provider if you have   any questions about contraceptive methods that might be right for you. How do I take PAXLOVID?  • PAXLOVID consists of 2 medicines: nirmatrelvir tablets and ritonavir  tablets. The 2 medicines are taken together 2 times each day for 5 days. o Medicine Bow Haugen is an oval, pink tablet.  o Ritonavir is a white or off-white tablet.   • PAXLOVID is available in 2 Dose Packs (see Figures A and B below). Your   healthcare provider will prescribe the PAXLOVID Dose Pack that is right for you. • If you have kidney disease, your healthcare provider may prescribe a lower   dose (see Figure B). Talk to your healthcare provider to make sure you   receive the correct Dose Pack. 3 Revised: 02/2023  Figure A  If you have a PAXLOVID 300 mg; 100 mg Dose Pack: each dose contains 3 tablets. How to take PAXLOVID 300 mg; 100 mg Dose Pack  Morning Dose:  Take the 2 pink nirmatrelvir tablets and   1 white to off-white ritonavir tablet together   at the same time each morning. Evening Dose:  Take the 2 pink nirmatrelvir tablets and   1 white to off-white ritonavir tablet together   at the same time each evening. 4 Revised: 02/2023  Figure B  If you have a PAXLOVID 150 mg; 100 mg Dose Pack: each dose contains 2 tablets. How to take PAXLOVID 150 mg; 100 mg Dose Pack  Morning Dose:  Take the 1 pink nirmatrelvir tablet and   1 white to off-white ritonavir tablet together   at the same time each morning. Evening Dose:  Take the 1 pink nirmatrelvir tablet and   1 white to off-white ritonavir tablet together   at the same time each evening. 5 Revised: 02/2023  • Do not remove your PAXLOVID tablets from the blister card before you are   ready to take your dose. • Take your first dose of PAXLOVID in the Morning or Evening, depending on   when you  your prescription, or as recommended by your healthcare   provider. • Swallow the tablets whole. Do not chew, break, or crush the tablets. • Take PAXLOVID with or without food. • Do not stop taking PAXLOVID without talking to your healthcare provider, even if   you feel better. • If you miss a dose of PAXLOVID within 8 hours of the time it is usually taken,   take it as soon as you remember. If you miss a dose by more than 8 hours, skip   the missed dose and take the next dose at your regular time.  Do not take   2 doses of PAXLOVID at the same time. • If you take too much PAXLOVID, call your healthcare provider or go to the   nearest hospital emergency room right away. • If you are taking a ritonavir- or cobicistat-containing medicine to treat hepatitis C   or Human Immunodeficiency Virus (HIV), you should continue to take your   medicine as prescribed by your healthcare provider. Talk to your healthcare provider if you do not feel better or if you feel worse after   5 days. Who should generally not take PAXLOVID? Do not take PAXLOVID if:  • You are allergic to nirmatrelvir, ritonavir, or any of the ingredients in PAXLOVID. • You are taking any of the following medicines:  o alfuzosin  o amiodarone  o apalutamide  o carbamazepine  o colchicine  o dihydroergotamine  o dronedarone  o eletriptan  o eplerenone  o ergotamine  o finerenone  o flecainide  o flibanserin  o ivabradine  o lomitapide  o lovastatin  o lumacaftor/ivacaftor  o lurasidone  o methylergonovine  o midazolam (oral)  o naloxegol  o phenobarbital  o phenytoin  o pimozide  o primidone  o propafenone  o quinidine  o ranolazine  o rifampin  o Darwin's Wort   (hypericum perforatum)  o sildenafil (Revatio®) for   pulmonary arterial   hypertension  o silodosin  o simvastatin  o tolvaptan  o triazolam  o ubrogepant  o voclosporin  Taking PAXLOVID with these medicines may cause serious or life-threatening side   effects or affect how PAXLOVID works. 6 Revised: 02/2023  These are not the only medicines that may cause serious side effects if taken with   PAXLOVID. PAXLOVID may increase or decrease the levels of multiple other   medicines. It is very important to tell your healthcare provider about all of the medicines   you are taking because additional laboratory tests or changes in the dose of your other   medicines may be necessary while you are taking PAXLOVID.  Your healthcare provider   may also tell you about specific symptoms to watch out for that may indicate that you   need to stop or decrease the dose of some of your other medicines. What are the important possible side effects of PAXLOVID? Possible side effects of PAXLOVID are:  • Allergic Reactions. Allergic reactions, including severe allergic reactions   (known as 'anaphylaxis'), can happen in people taking PAXLOVID, even after   only 1 dose. Stop taking PAXLOVID and call your healthcare provider right   away if you get any of the following symptoms of an allergic reaction:  o hives  o trouble swallowing or breathing  o swelling of the mouth, lips, or face  o throat tightness  o hoarseness  o skin rash  • Liver Problems. Tell your healthcare provider right away if you have any of   these signs and symptoms of liver problems: loss of appetite, yellowing of your   skin and the whites of eyes (jaundice), dark-colored urine, pale colored stools   and itchy skin, stomach area (abdominal) pain. • Resistance to HIV Medicines. If you have untreated HIV infection, PAXLOVID   may lead to some HIV medicines not working as well in the future. • Other possible side effects include:   o altered sense of taste   o diarrhea   o high blood pressure   o muscle aches  o abdominal pain  o nausea  o feeling generally unwell  These are not all the possible side effects of PAXLOVID. Not many people have taken  PAXLOVID. Serious and unexpected side effects may happen. Susannah Walker is still being   studied, so it is possible that all of the risks are not known at this time. What other treatment choices are there? Veklury (remdesivir) is FDA-approved for the treatment of mild-to-moderate ADJLG-28   in certain adults and children. Talk with your healthcare provider to see if Dewey Ross is   appropriate for you. 7 Revised: 02/2023  Like Carina Macario may also allow for the emergency use of other medicines to treat   people with COVID-19.  Go to https://Iptune/ for   information on the emergency use of other medicines that are authorized by FDA to   treat people with COVID-19. Your healthcare provider may talk with you about clinical   trials for which you may be eligible. It is your choice to be treated or not to be treated with PAXLOVID. Should you decide   not to receive it or for your child not to receive it, it will not change your standard   medical care. What if I am pregnant or breastfeeding? There is no experience treating pregnant women or breastfeeding mothers with   PAXLOVID. For a mother and unborn baby, the benefit of taking PAXLOVID may be   greater than the risk from the treatment. If you are pregnant, discuss your options and   specific situation with your healthcare provider. It is recommended that you use effective barrier contraception or do not have sexual   activity while taking PAXLOVID. If you are breastfeeding, discuss your options and specific situation with your   healthcare provider. How do I report side effects or problems with the appearance or packaging of  PAXLOVID? Contact your healthcare provider if you have any side effects that bother you or do not   go away. Report side effects or problems with the appearance or packaging of PAXLOVID (see   Figures A and B above for examples of PAXLOVID Dose Packs) to FDA MedWatch at   www.fda.gov/medwatch or call 8-694-EIN-8654 or you can report side effects to [employer]. at the contact information provided below. Website Fax number Telephone number  wwwTransifex [phone]  How should I store [address] [place] [address]? Store PAXLOVID tablets at room temperature, between 68? F to 77? F (20?C to 25? C). How can I learn more about COVID-19? • Ask your healthcare provider. • Visit [employer].TeleFlip.   • Contact your local or state public health department. 8 Revised: 02/2023  What is an Emergency Use Authorization (EUA)? The [facility] FDA has made PAXLOVID available under an emergency access   mechanism called an Emergency Use Authorization (EUA). The EUA is supported by a    of Health and Human Services ([facility]) declaration that circumstances exist to   justify the emergency use of drugs and biological products during the COVID-19   pandemic. PAXLOVID for the treatment of mild-to-moderate COVID-19 in adults and children   [15years of age and older weighing at least 80 pounds (36 kg)] who are at high risk for   progression to severe COVID-19, including hospitalization or death, has not undergone   the same type of review as an FDA-approved product. In issuing an EUA under the   SHZWO-67 public health emergency, the FDA has determined, among other things, that   based on the total amount of scientific evidence available including data from adequate   and well-controlled clinical trials, if available, it is reasonable to believe that the product   may be effective for diagnosing, treating, or preventing COVID-19, or a serious or   life-threatening disease or condition caused by COVID-19; that the known and potential   benefits of the product, when used to diagnose, treat, or prevent such disease or   condition, outweigh the known and potential risks of such product; and that there are no   adequate, approved, and available alternatives. All of these criteria must be met to allow for the product to be used in the treatment of   patients during the COVID-19 pandemic. The EUA for PAXLOVID is in effect for the   duration of the COVID-19 declaration justifying emergency use of this product, unless   terminated or revoked (after which the products may no longer be used under the   EUA). Additional Information  For general questions, visit the website or call the telephone number provided below.    Website Telephone number  www. [email or web address]  [phone]  (5-337-A26-PACK)  You can also go to [email or web address] or call [phone] for more   information. Follow up with PCP if not improved, if symptoms are worse, go to the ER.

## 2023-08-03 NOTE — PATIENT INSTRUCTIONS
Home covid test positive. Patient would like to start paxlovid. Discussed side effects. Discussed he needs to d/c zocor for 10 days. Rest and drink extra fluids. Tylenol as needed for pain. Go to ER with any worsening symptoms, chest pain, sob, difficulty breathing, lethargy, confusion, dehyrdration, persistent fever 103 or higher. Please Be Courteous:  You are being tested for novel coronavirus (COVID-19) your test is pending at this time. You need to self quarantine at least until you have the results back. This means go home and stay home. Have someone else  your medications for you and bring them to you and drop them off at your door. Tests typically come back in 1-3 days. Results can be found in the "COVID-19" section of your [facility] account. In Your Home:  If you live with other people, trying to avoid common spaces and disinfect areas that you come into contact with. Per the CDC's recommendations: persons who suspect that they might have COVID-19 should isolate, stay at home, and use a separate bedroom and bathroom if feasible. Isolation should begin even before seeking testing and before test results become available. All household members should start wearing a mask in the home, particularly in shared spaces where appropriate distancing is not possible. Take Care of Yourself:  Try to sleep on your stomach as much as possible. If you have the ability to, take vitamin D3 2000 IU by mouth daily, vitamin-C 1000 mg by mouth twice a day, a multivitamin daily to help boost your immune system. You should check your temperature twice day. Return to the emergency department for any severe shortness of breath or pulse ox less than 90%. If You Test Positive for COVID-19 (Isolate)     Everyone, regardless of vaccination status:     Stay home for 5 days. If you have no symptoms or your symptoms are resolving after 5 days, you can leave your house.   Continue to wear a mask around others for 5 additional days. If you have a fever, continue to stay home until your fever resolves. If You Were Exposed to Someone with COVID-19 (Quarantine)  If you:  Have been boosted  OR  Completed the primary series of Pfizer or Moderna vaccine within the last 6 months  OR  Completed the primary series of J&J vaccine within the last 2 months     Wear a mask around others for 10 days. Test on day 5, if possible. If you develop symptoms get a test and stay home. If you:  Completed the primary series of Pfizer or Moderna vaccine over 6 months ago and are not boosted  OR  Completed the primary series of J&J over 2 months ago and are not boosted  OR  Are unvaccinated     Stay home for 5 days. After that continue to wear a mask around others for 5 additional days. If you can’t quarantine you must wear a mask for 10 days. Test on day 5 if possible. If you develop symptoms get a test and stay home        CT SHEET FOR PATIENTS, PARENTS, AND CAREGIVERS  EMERGENCY USE AUTHORIZATION (EUA) OF PAXLOVID  FOR CORONAVIRUS DISEASE 2019 (COVID-19)  You are being given this Fact Sheet because your healthcare provider believes it is   necessary to provide you with PAXLOVID for the treatment of mild-to-moderate   coronavirus disease (COVID-19) caused by the SARS-CoV-2 virus. This Fact Sheet   contains information to help you understand the risks and benefits of taking the   PAXLOVID you have received or may receive. This Fact Sheet also contains   information about how to take PAXLOVID and how to report side effects or problems   with the appearance or packaging of PAXLOVID. The U.S. Food and Drug Administration (FDA) has issued an Emergency Use   Authorization (EUA) to make PAXLOVID available during the COVID-19 pandemic (for   more details about an EUA please see Regla Black is an Emergency Use Authorization?”   at the end of this document).  [address] [place], [place] [address]way is not an FDA approved medicine in the   [place]. Read this Fact Sheet for information about PAXLOVID. Talk to your   healthcare provider about your options or if you have any questions. It is your choice to   take PAXLOVID. What is COVID-19? COVID-19 is caused by a virus called a coronavirus. You can get COVID-19 through   close contact with another person who has the virus. COVID-19 illnesses have ranged from very mild-to-severe, including illness resulting in   death. While information so far suggests that most COVID-19 illness is mild, serious   illness can happen and may cause some of your other medical conditions to become   worse. Older people and people of all ages with severe, long lasting (chronic) medical   conditions like heart disease, lung disease, and diabetes, for example seem to be at   higher risk of being hospitalized for COVID-19. What is PAXLOVID? Ana Laura Service is an investigational medicine used to treat adults and children [12 years of   age and older weighing at least 80 pounds (36 kg)] with a current diagnosis of   mild-to-moderate COVID-19 and who are at high risk for progression to severe COVID19, including hospitalization or death. PAXLOVID is investigational because it is still   being studied. There is limited information about the safety and effectiveness of using   PAXLOVID to treat people with mild-to-moderate COVID-19. The FDA has authorized the emergency use of PAXLOVID for the treatment of adults   and children [15years of age and older weighing at least 80 pounds (36 kg)] with a   current diagnosis of mild-to-moderate COVID-19 and who are at high risk for   progression to severe COVID-19, including hospitalization or death, under an EUA. 2 Revised: 02/2023  What should I tell my healthcare provider before I take PAXLOVID?   Tell your healthcare provider if you:   Have any allergies   Have liver or kidney disease    Are pregnant or plan to become pregnant   Are breastfeeding a child   Have any serious illnesses  Some medicines may interact with PAXLOVID and may cause serious side   effects. Tell your healthcare provider about all the medicines you take, including   prescription and over-the-counter medicines, vitamins, and herbal supplements. Your healthcare provider can tell you if it is safe to take PAXLOVID with other   medicines. You can ask your healthcare provider or pharmacist for a list of medicines that   interact with PAXLOVID. Do not start taking a new medicine without telling your healthcare provider. Tell your healthcare provider if you are taking combined hormonal contraceptive. PAXLOVID may affect how your birth control pills work. Females who are able to   become pregnant should use another effective alternative form of contraception or an   additional barrier method of contraception. Talk to your healthcare provider if you have   any questions about contraceptive methods that might be right for you. How do I take PAXLOVID? PAXLOVID consists of 2 medicines: nirmatrelvir tablets and ritonavir  tablets. The 2 medicines are taken together 2 times each day for 5 days. o Vonna Hancock is an oval, pink tablet.  o Ritonavir is a white or off-white tablet. Elana Mitra is available in 2 Dose Packs (see Figures A and B below). Your   healthcare provider will prescribe the PAXLOVID Dose Pack that is right for you. If you have kidney disease, your healthcare provider may prescribe a lower   dose (see Figure B). Talk to your healthcare provider to make sure you   receive the correct Dose Pack. 3 Revised: 02/2023  Figure A  If you have a PAXLOVID 300 mg; 100 mg Dose Pack: each dose contains 3 tablets. How to take PAXLOVID 300 mg; 100 mg Dose Pack  Morning Dose:  Take the 2 pink nirmatrelvir tablets and   1 white to off-white ritonavir tablet together   at the same time each morning.   Evening Dose:  Take the 2 pink nirmatrelvir tablets and   1 white to off-white ritonavir tablet together   at the same time each evening. 4 Revised: 02/2023  Figure B  If you have a PAXLOVID 150 mg; 100 mg Dose Pack: each dose contains 2 tablets. How to take PAXLOVID 150 mg; 100 mg Dose Pack  Morning Dose:  Take the 1 pink nirmatrelvir tablet and   1 white to off-white ritonavir tablet together   at the same time each morning. Evening Dose:  Take the 1 pink nirmatrelvir tablet and   1 white to off-white ritonavir tablet together   at the same time each evening. 5 Revised: 02/2023   Do not remove your PAXLOVID tablets from the blister card before you are   ready to take your dose. Take your first dose of PAXLOVID in the Morning or Evening, depending on   when you  your prescription, or as recommended by your healthcare   provider. Swallow the tablets whole. Do not chew, break, or crush the tablets. Take PAXLOVID with or without food. Do not stop taking PAXLOVID without talking to your healthcare provider, even if   you feel better. If you miss a dose of PAXLOVID within 8 hours of the time it is usually taken,   take it as soon as you remember. If you miss a dose by more than 8 hours, skip   the missed dose and take the next dose at your regular time. Do not take   2 doses of PAXLOVID at the same time. If you take too much PAXLOVID, call your healthcare provider or go to the   nearest hospital emergency room right away. If you are taking a ritonavir- or cobicistat-containing medicine to treat hepatitis C   or Human Immunodeficiency Virus (HIV), you should continue to take your   medicine as prescribed by your healthcare provider. Talk to your healthcare provider if you do not feel better or if you feel worse after   5 days. Who should generally not take PAXLOVID? Do not take PAXLOVID if:   You are allergic to nirmatrelvir, ritonavir, or any of the ingredients in PAXLOVID.    You are taking any of the following medicines:  o alfuzosin  o amiodarone  o apalutamide  o carbamazepine  o colchicine  o dihydroergotamine  o dronedarone  o eletriptan  o eplerenone  o ergotamine  o finerenone  o flecainide  o flibanserin  o ivabradine  o lomitapide  o lovastatin  o lumacaftor/ivacaftor  o lurasidone  o methylergonovine  o midazolam (oral)  o naloxegol  o phenobarbital  o phenytoin  o pimozide  o primidone  o propafenone  o quinidine  o ranolazine  o rifampin  o Rick's Wort   (hypericum perforatum)  o sildenafil (Revatio®) for   pulmonary arterial   hypertension  o silodosin  o simvastatin  o tolvaptan  o triazolam  o ubrogepant  o voclosporin  Taking PAXLOVID with these medicines may cause serious or life-threatening side   effects or affect how PAXLOVID works. 6 Revised: 02/2023  These are not the only medicines that may cause serious side effects if taken with   PAXLOVID. PAXLOVID may increase or decrease the levels of multiple other   medicines. It is very important to tell your healthcare provider about all of the medicines   you are taking because additional laboratory tests or changes in the dose of your other   medicines may be necessary while you are taking PAXLOVID. Your healthcare provider   may also tell you about specific symptoms to watch out for that may indicate that you   need to stop or decrease the dose of some of your other medicines. What are the important possible side effects of PAXLOVID? Possible side effects of PAXLOVID are:   Allergic Reactions. Allergic reactions, including severe allergic reactions   (known as 'anaphylaxis'), can happen in people taking PAXLOVID, even after   only 1 dose. Stop taking PAXLOVID and call your healthcare provider right   away if you get any of the following symptoms of an allergic reaction:  o hives  o trouble swallowing or breathing  o swelling of the mouth, lips, or face  o throat tightness  o hoarseness  o skin rash   Liver Problems.  Tell your healthcare provider right away if you have any of   these signs and symptoms of liver problems: loss of appetite, yellowing of your   skin and the whites of eyes (jaundice), dark-colored urine, pale colored stools   and itchy skin, stomach area (abdominal) pain. Resistance to HIV Medicines. If you have untreated HIV infection, PAXLOVID   may lead to some HIV medicines not working as well in the future. Other possible side effects include:   o altered sense of taste   o diarrhea   o high blood pressure   o muscle aches  o abdominal pain  o nausea  o feeling generally unwell  These are not all the possible side effects of PAXLOVID. Not many people have taken  PAXLOVID. Serious and unexpected side effects may happen. Kadeem Chicas is still being   studied, so it is possible that all of the risks are not known at this time. What other treatment choices are there? Veklury (remdesivir) is FDA-approved for the treatment of mild-to-moderate MCNKG-99   in certain adults and children. Talk with your healthcare provider to see if Jasonalka Masters is   appropriate for you. 7 Revised: 02/2023  Like Lo Notice may also allow for the emergency use of other medicines to treat   people with COVID-19. Go to https://[employer]/ for   information on the emergency use of other medicines that are authorized by FDA to   treat people with COVID-19. Your healthcare provider may talk with you about clinical   trials for which you may be eligible. It is your choice to be treated or not to be treated with PAXLOVID. Should you decide   not to receive it or for your child not to receive it, it will not change your standard   medical care. What if I am pregnant or breastfeeding? There is no experience treating pregnant women or breastfeeding mothers with   PAXLOVID. For a mother and unborn baby, the benefit of taking PAXLOVID may be   greater than the risk from the treatment.  If you are pregnant, discuss your options and   specific situation with your healthcare provider. It is recommended that you use effective barrier contraception or do not have sexual   activity while taking PAXLOVID. If you are breastfeeding, discuss your options and specific situation with your   healthcare provider. How do I report side effects or problems with the appearance or packaging of  PAXLOVID? Contact your healthcare provider if you have any side effects that bother you or do not   go away. Report side effects or problems with the appearance or packaging of PAXLOVID (see   Figures A and B above for examples of PAXLOVID Dose Packs) to FDA MedWatch at   www.fda.gov/medwatch or call 3-238-[employer]-9176 or you can report side effects to [employer]. at the contact information provided below. Website Fax number Telephone number  [facility] [phone]  How should I store [address] [place] [address]? Store PAXLOVID tablets at room temperature, between 68? F to 77? F (20?C to 25? C). How can I learn more about COVID-19? Ask your healthcare provider. Visit [employer]. Contact your local or state public health department. 8 Revised: 02/2023  What is an Emergency Use Authorization (EUA)? The [facility] has made PAXLOVID available under an emergency access   mechanism called an Emergency Use Authorization (EUA). The EUA is supported by a    of Health and Human Services (HHS) declaration that circumstances exist to   justify the emergency use of drugs and biological products during the COVID-19   pandemic. PAXLOVID for the treatment of mild-to-moderate COVID-19 in adults and children   [15years of age and older weighing at least 80 pounds (36 kg)] who are at high risk for   progression to severe COVID-19, including hospitalization or death, has not undergone   the same type of review as an FDA-approved product.  In issuing an EUA under the   YDEDW-89 public health emergency, the FDA has determined, among other things, that   based on the total amount of scientific evidence available including data from adequate   and well-controlled clinical trials, if available, it is reasonable to believe that the product   may be effective for diagnosing, treating, or preventing COVID-19, or a serious or   life-threatening disease or condition caused by COVID-19; that the known and potential   benefits of the product, when used to diagnose, treat, or prevent such disease or   condition, outweigh the known and potential risks of such product; and that there are no   adequate, approved, and available alternatives. All of these criteria must be met to allow for the product to be used in the treatment of   patients during the COVID-19 pandemic. The EUA for PAXLOVID is in effect for the   duration of the COVID-19 declaration justifying emergency use of this product, unless   terminated or revoked (after which the products may no longer be used under the   EUA). Additional Information  For general questions, visit the website or call the telephone number provided below. Website Telephone number  www. [email or web address]  5-216.193.1333 (1-877-c19-pack)  You can also go to [email or web address] or call [phone] for more   information.

## 2023-08-03 NOTE — CARE ANYWHERE EVISITS
Visit Summary for RAÚL . [facility] - Gender: Male - Date of Birth: [id number]  Date: [id number] - Duration: 9 minutes  Patient: Paula Nguyen . [facility]  Provider: Aisha OSORIO    Patient Contact Information  Address  [address] [place], [place] [address]  [phone]    Visit Topics  + COVID test  [Added By: Self - 2023-08-03]    Triage Questions   What is your current physical address in the event of a medical emergency? Answer []  Are you allergic to any medications? Answer []  Are you now or could you be pregnant? Answer []  Do you have any immune system compromise or chronic lung   disease? Answer []  Do you have any vulnerable family members in the home (infant, pregnant, cancer, elderly)? Answer []     Conversation Transcripts  [0A][0A] [Notification] You are connected with Guerda Roth, Urgent Care Specialist.[0A][Notification] Maryann Lozano is located in [place]. [0A][Notification] Maryann Lozano has shared health history. Tiffanie Tran .[0A]    Diagnosis  COVID-19    Procedures  Value: [id number] Code: CPT-4 UNLISTED E&M SERVICE    Medications Prescribed    No prescriptions ordered    Electronically signed by: Guerda Villeda(NPI [id number])

## 2023-11-04 ENCOUNTER — APPOINTMENT (OUTPATIENT)
Dept: LAB | Facility: IMAGING CENTER | Age: 43
End: 2023-11-04
Payer: COMMERCIAL

## 2023-11-04 DIAGNOSIS — R73.03 PREDIABETES: ICD-10-CM

## 2023-11-04 DIAGNOSIS — E78.00 HYPERCHOLESTEROLEMIA: ICD-10-CM

## 2023-11-04 LAB
ANION GAP SERPL CALCULATED.3IONS-SCNC: 6 MMOL/L
BUN SERPL-MCNC: 10 MG/DL (ref 5–25)
CALCIUM SERPL-MCNC: 9.1 MG/DL (ref 8.4–10.2)
CHLORIDE SERPL-SCNC: 101 MMOL/L (ref 96–108)
CHOLEST SERPL-MCNC: 159 MG/DL
CO2 SERPL-SCNC: 29 MMOL/L (ref 21–32)
CREAT SERPL-MCNC: 0.82 MG/DL (ref 0.6–1.3)
GFR SERPL CREATININE-BSD FRML MDRD: 108 ML/MIN/1.73SQ M
GLUCOSE P FAST SERPL-MCNC: 112 MG/DL (ref 65–99)
HDLC SERPL-MCNC: 37 MG/DL
LDLC SERPL CALC-MCNC: 108 MG/DL (ref 0–100)
POTASSIUM SERPL-SCNC: 4.3 MMOL/L (ref 3.5–5.3)
SODIUM SERPL-SCNC: 136 MMOL/L (ref 135–147)
TRIGL SERPL-MCNC: 69 MG/DL

## 2023-11-04 PROCEDURE — 36415 COLL VENOUS BLD VENIPUNCTURE: CPT

## 2023-11-04 PROCEDURE — 80061 LIPID PANEL: CPT

## 2023-11-04 PROCEDURE — 80048 BASIC METABOLIC PNL TOTAL CA: CPT

## 2023-11-04 PROCEDURE — 83036 HEMOGLOBIN GLYCOSYLATED A1C: CPT

## 2023-11-05 LAB
EST. AVERAGE GLUCOSE BLD GHB EST-MCNC: 126 MG/DL
HBA1C MFR BLD: 6 %

## 2023-11-10 ENCOUNTER — OFFICE VISIT (OUTPATIENT)
Dept: INTERNAL MEDICINE CLINIC | Facility: OTHER | Age: 43
End: 2023-11-10
Payer: COMMERCIAL

## 2023-11-10 VITALS
HEIGHT: 72 IN | BODY MASS INDEX: 36.87 KG/M2 | DIASTOLIC BLOOD PRESSURE: 86 MMHG | TEMPERATURE: 97.8 F | WEIGHT: 272.2 LBS | OXYGEN SATURATION: 98 % | HEART RATE: 85 BPM | SYSTOLIC BLOOD PRESSURE: 120 MMHG

## 2023-11-10 DIAGNOSIS — E78.00 HYPERCHOLESTEROLEMIA: ICD-10-CM

## 2023-11-10 DIAGNOSIS — I10 ESSENTIAL HYPERTENSION: ICD-10-CM

## 2023-11-10 DIAGNOSIS — R73.03 PREDIABETES: Primary | ICD-10-CM

## 2023-11-10 DIAGNOSIS — Z00.00 ANNUAL PHYSICAL EXAM: ICD-10-CM

## 2023-11-10 DIAGNOSIS — E66.01 CLASS 2 SEVERE OBESITY DUE TO EXCESS CALORIES WITH SERIOUS COMORBIDITY AND BODY MASS INDEX (BMI) OF 35.0 TO 35.9 IN ADULT: ICD-10-CM

## 2023-11-10 PROBLEM — N20.0 KIDNEY STONE: Status: RESOLVED | Noted: 2019-05-30 | Resolved: 2023-11-10

## 2023-11-10 PROCEDURE — 99396 PREV VISIT EST AGE 40-64: CPT | Performed by: INTERNAL MEDICINE

## 2023-11-10 PROCEDURE — 99214 OFFICE O/P EST MOD 30 MIN: CPT | Performed by: INTERNAL MEDICINE

## 2023-11-10 RX ORDER — SIMVASTATIN 10 MG
10 TABLET ORAL
Qty: 90 TABLET | Refills: 1 | Status: SHIPPED | OUTPATIENT
Start: 2023-11-10

## 2023-11-10 RX ORDER — LOSARTAN POTASSIUM AND HYDROCHLOROTHIAZIDE 12.5; 1 MG/1; MG/1
1 TABLET ORAL DAILY
Qty: 90 TABLET | Refills: 1 | Status: SHIPPED | OUTPATIENT
Start: 2023-11-10

## 2023-11-10 NOTE — PROGRESS NOTES
ADULT ANNUAL  [facility] PRIMARY CARE [place]    NAME: Blake Yeh  AGE: 37 y.o. SEX: male  : 1980     DATE: 11/10/2023     Assessment and Plan:     Problem List Items Addressed This Visit        Cardiovascular and Mediastinum    Essential hypertension     Controlled. Continue losartan-hydrochlorothiazide 100-12.5 mg daily. Relevant Medications    losartan-hydrochlorothiazide (HYZAAR) 100-12.5 MG per tablet       Other    Annual physical exam     Discussed diet and exercise. He is up-to-date on lipid cardiovascular and diabetes screening. He is up-to-date on immunizations although would like to defer on influenza and COVID immunization at this time. Class 2 severe obesity due to excess calories with serious comorbidity and body mass index (BMI) of 35.0 to 35.9 in adult     Hypercholesterolemia     Discussed diet and exercise. Continue simvastatin 10 mg daily. Relevant Medications    simvastatin (ZOCOR) 10 mg tablet    Prediabetes - Primary     Discussed diet and exercise. Trend A1c. Immunizations and preventive care screenings were discussed with patient today. Appropriate education was printed on patient's after visit summary. Discussed risks and benefits of prostate cancer screening. We discussed the controversial history of PSA screening for prostate cancer in the [facility] as well as the risk of over detection and over treatment of prostate cancer by way of PSA screening. The patient understands that PSA blood testing is an imperfect way to screen for prostate cancer and that elevated PSA levels in the blood may also be caused by infection, inflammation, prostatic trauma or manipulation, urological procedures, or by benign prostatic enlargement.     The role of the digital rectal examination in prostate cancer screening was also discussed and I discussed with him that there is large interobserver variability in the findings of digital rectal examination. Counseling:  Alcohol/drug use: discussed moderation in alcohol intake, the recommendations for healthy alcohol use, and avoidance of illicit drug use. Dental Health: discussed importance of regular tooth brushing, flossing, and dental visits. Injury prevention: discussed safety/seat belts, safety helmets, smoke detectors, carbon dioxide detectors, and smoking near bedding or upholstery. Sexual health: discussed sexually transmitted diseases, partner selection, use of condoms, avoidance of unintended pregnancy, and contraceptive alternatives. Exercise: the importance of regular exercise/physical activity was discussed. Recommend exercise 3-5 times per week for at least 30 minutes. BMI Counseling: Body mass index is 36.92 kg/m². The BMI is above normal. Nutrition recommendations include decreasing portion sizes, encouraging healthy choices of fruits and vegetables, decreasing fast food intake, consuming healthier snacks, limiting drinks that contain sugar, moderation in carbohydrate intake, increasing intake of lean protein, reducing intake of saturated and trans fat and reducing intake of cholesterol. Exercise recommendations include moderate physical activity 150 minutes/week and exercising 3-5 times per week. No pharmacotherapy was ordered. Patient referred to PCP. Rationale for BMI follow-up plan is due to patient being overweight or obese. Return in about 6 months (around 5/10/2024) for Next scheduled follow up. Chief Complaint:     Chief Complaint   Patient presents with   • Follow-up     Review labs done 11/4      History of Present Illness:     Adult Annual Physical   Patient here for a comprehensive physical exam. The patient reports no problems. Diet and Physical Activity  Diet/Nutrition: well balanced diet. Exercise: no formal exercise.       Depression Screening  PHQ-2/9 Depression Screening         General Health  Sleep: sleeps well and gets 7-8 hours of sleep on average. Hearing: normal - bilateral.  Vision: no vision problems, goes for regular eye exams, and wears glasses. Dental: regular dental visits, brushes teeth twice daily, and flosses teeth occasionally.  Health  Symptoms include: none     Review of Systems:     Review of Systems   Constitutional:  Negative for activity change, appetite change, chills, diaphoresis, fatigue and fever. HENT:  Negative for congestion, postnasal drip, rhinorrhea, sinus pressure, sinus pain, sneezing and sore throat. Eyes:  Negative for visual disturbance. Respiratory:  Negative for apnea, cough, choking, chest tightness, shortness of breath and wheezing. Cardiovascular:  Negative for chest pain, palpitations and leg swelling. Gastrointestinal:  Negative for abdominal distention, abdominal pain, anal bleeding, blood in stool, constipation, diarrhea, nausea and vomiting. Endocrine: Negative for cold intolerance and heat intolerance. Genitourinary:  Negative for difficulty urinating, dysuria and hematuria. Musculoskeletal: Negative. Skin: Negative. Neurological:  Negative for dizziness, weakness, light-headedness, numbness and headaches. Hematological:  Negative for adenopathy. Psychiatric/Behavioral:  Negative for agitation, sleep disturbance and suicidal ideas. All other systems reviewed and are negative.      Past Medical History:     Past Medical History:   Diagnosis Date   • Anxiety 2/23/2021   • Class 1 obesity due to excess calories without serious comorbidity with body mass index (BMI) of 34.0 to 34.9 in adult 9/25/2018   • Essential hypertension 11/6/2018   • Grief reaction 4/22/2022   • Hematuria 5/30/2019   • Kidney stone    • Kidney stone 5/30/2019   • Pneumonia       Past Surgical History:     Past Surgical History:   Procedure Laterality Date   • NO PAST SURGERIES     • WISDOM TOOTH EXTRACTION     • WRIST SURGERY        Family History:     Family History   Problem Relation Age of Onset   • Hypertension Mother    • Diabetes Father    • Hypertension Father    • Rheumatic fever Father    • Alcohol abuse Paternal Grandfather    • Heart failure Paternal Grandfather    • Substance Abuse Paternal Grandfather    • Diabetes Family       Social History:     Social History     Socioeconomic History   • Marital status: Single     Spouse name: None   • Number of children: None   • Years of education: None   • Highest education level: None   Occupational History   • None   Tobacco Use   • Smoking status: Former     Packs/day: 0.25     Years: 1.00     Total pack years: 0.25     Types: Cigarettes     Start date:      Quit date:      Years since quittin.8   • Smokeless tobacco: Never   Vaping Use   • Vaping Use: Never used   Substance and Sexual Activity   • Alcohol use: Yes     Alcohol/week: 1.0 standard drink of alcohol     Types: 1 Cans of beer per week     Comment: social   • Drug use: Not Currently     Comment: Use of Marijuana in the past    • Sexual activity: Yes   Other Topics Concern   • None   Social History Narrative   • None     Social Determinants of Health     Financial Resource Strain: Not on file   Food Insecurity: Not on file   Transportation Needs: Not on file   Physical Activity: Not on file   Stress: Not on file   Social Connections: Not on file   Intimate Partner Violence: Not on file   Housing Stability: Not on file      Current Medications:     Current Outpatient Medications   Medication Sig Dispense Refill   • losartan-hydrochlorothiazide (HYZAAR) 100-12.5 MG per tablet Take 1 tablet by mouth daily 90 tablet 1   • simvastatin (ZOCOR) 10 mg tablet Take 1 tablet (10 mg total) by mouth daily at bedtime 90 tablet 1     No current facility-administered medications for this visit. Allergies:      Allergies   Allergen Reactions   • Penicillins Rash     Annotation - [id number]: HOwever he has been treated with Amoxicllin for sinusities with no problems. Physical Exam:     /86 (BP Location: Left arm, Patient Position: Sitting, Cuff Size: Large)   Pulse 85   Temp 97.8 °F (36.6 °C) (Temporal)   Ht 6' (1.829 m)   Wt 123 kg (272 lb 3.2 oz)   SpO2 98%   BMI 36.92 kg/m²     Physical Exam  Vitals and nursing note reviewed. Constitutional:       General: He is not in acute distress. Appearance: Normal appearance. He is normal weight. He is not ill-appearing, toxic-appearing or diaphoretic. HENT:      Head: Normocephalic and atraumatic. Right Ear: Tympanic membrane, ear canal and external ear normal. There is no impacted cerumen. Left Ear: Tympanic membrane, ear canal and external ear normal. There is no impacted cerumen. Nose: Nose normal. No congestion or rhinorrhea. Mouth/Throat:      Mouth: Mucous membranes are moist.      Pharynx: Oropharynx is clear. No oropharyngeal exudate or posterior oropharyngeal erythema. Eyes:      General: No scleral icterus. Right eye: No discharge. Left eye: No discharge. Extraocular Movements: Extraocular movements intact. Conjunctiva/sclera: Conjunctivae normal.      Pupils: Pupils are equal, round, and reactive to light. Neck:      Vascular: No carotid bruit. Cardiovascular:      Rate and Rhythm: Normal rate and regular rhythm. Pulses: Normal pulses. Heart sounds: Normal heart sounds. No murmur heard. No friction rub. No gallop. Pulmonary:      Effort: Pulmonary effort is normal. No respiratory distress. Breath sounds: Normal breath sounds. No wheezing or rales. Abdominal:      General: Abdomen is flat. Bowel sounds are normal. There is no distension. Palpations: Abdomen is soft. There is no mass. Tenderness: There is no abdominal tenderness. There is no guarding. Hernia: No hernia is present. Musculoskeletal:         General: No swelling, tenderness, deformity or signs of injury.  Normal range of motion. Cervical back: Normal range of motion and neck supple. No rigidity. No muscular tenderness. Right lower leg: No edema. Left lower leg: No edema. Lymphadenopathy:      Cervical: No cervical adenopathy. Skin:     General: Skin is warm and dry. Capillary Refill: Capillary refill takes less than 2 seconds. Coloration: Skin is not jaundiced or pale. Findings: No bruising, erythema, lesion or rash. Neurological:      General: No focal deficit present. Mental Status: He is alert and oriented to person, place, and time. Mental status is at baseline. Cranial Nerves: No cranial nerve deficit. Sensory: No sensory deficit. Motor: No weakness. Coordination: Coordination normal.      Gait: Gait normal.      Deep Tendon Reflexes: Reflexes normal.   Psychiatric:         Mood and Affect: Mood normal.         Behavior: Behavior normal.         Thought Content:  Thought content normal.         Judgment: Judgment normal.          Chiquis Ross MD  [id number] [facility] [place]  PRIMARY CARE [place]

## 2023-11-10 NOTE — PROGRESS NOTES
Assessment/Plan:    Essential hypertension  Controlled. Continue losartan-hydrochlorothiazide 100-12.5 mg daily. Prediabetes  Discussed diet and exercise. Trend A1c. Hypercholesterolemia  Discussed diet and exercise. Continue simvastatin 10 mg daily. Annual physical exam  Discussed diet and exercise. He is up-to-date on lipid cardiovascular and diabetes screening. He is up-to-date on immunizations although would like to defer on influenza and COVID immunization at this time. Diagnoses and all orders for this visit:    Prediabetes    Essential hypertension  -     losartan-hydrochlorothiazide (HYZAAR) 100-12.5 MG per tablet; Take 1 tablet by mouth daily    Hypercholesterolemia  -     simvastatin (ZOCOR) 10 mg tablet; Take 1 tablet (10 mg total) by mouth daily at bedtime    Class 2 severe obesity due to excess calories with serious comorbidity and body mass index (BMI) of 35.0 to 35.9 in adult     Annual physical exam                  Subjective:      Patient ID: Dayna Ledesma is a 37 y.o. male. Chief Complaint   Patient presents with   • Follow-up     Review labs done 64       29-year-old male seen today for follow-up of chronic conditions. Recent laboratory studies reviewed today: A1c ^%. CMP and lipid panel are within acceptable range. He has been compliant with his medication regimen. Has not complaints or concerns at this time. Hypertension  This is a chronic problem. The current episode started more than 1 year ago. The problem is unchanged. The problem is controlled. Pertinent negatives include no chest pain, headaches, palpitations or shortness of breath. Past treatments include diuretics and angiotensin blockers. The current treatment provides moderate improvement. There are no compliance problems. Hyperlipidemia  This is a chronic problem. The current episode started more than 1 year ago. The problem is controlled.  Recent lipid tests were reviewed and are normal. Pertinent negatives include no chest pain or shortness of breath. Current antihyperlipidemic treatment includes statins. The current treatment provides moderate improvement of lipids. There are no compliance problems. The following portions of the patient's history were reviewed and updated as appropriate: allergies, current medications, past family history, past medical history, past social history, past surgical history, and problem list.    Review of Systems   Constitutional:  Negative for activity change, appetite change, chills, diaphoresis, fatigue and fever. HENT:  Negative for congestion, postnasal drip, rhinorrhea, sinus pressure, sinus pain, sneezing and sore throat. Eyes:  Negative for visual disturbance. Respiratory:  Negative for apnea, cough, choking, chest tightness, shortness of breath and wheezing. Cardiovascular:  Negative for chest pain, palpitations and leg swelling. Gastrointestinal:  Negative for abdominal distention, abdominal pain, anal bleeding, blood in stool, constipation, diarrhea, nausea and vomiting. Endocrine: Negative for cold intolerance and heat intolerance. Genitourinary:  Negative for difficulty urinating, dysuria and hematuria. Musculoskeletal: Negative. Skin: Negative. Neurological:  Negative for dizziness, weakness, light-headedness, numbness and headaches. Hematological:  Negative for adenopathy. Psychiatric/Behavioral:  Negative for agitation, sleep disturbance and suicidal ideas. All other systems reviewed and are negative.         Past Medical History:   Diagnosis Date   • Anxiety 2/23/2021   • Class 1 obesity due to excess calories without serious comorbidity with body mass index (BMI) of 34.0 to 34.9 in adult 9/25/2018   • Essential hypertension 11/6/2018   • Grief reaction 4/22/2022   • Hematuria 5/30/2019   • Kidney stone    • Kidney stone 5/30/2019   • Pneumonia          Current Outpatient Medications:   • losartan-hydrochlorothiazide (HYZAAR) 100-12.5 MG per tablet, Take 1 tablet by mouth daily, Disp: 90 tablet, Rfl: 1  •  simvastatin (ZOCOR) 10 mg tablet, Take 1 tablet (10 mg total) by mouth daily at bedtime, Disp: 90 tablet, Rfl: 1    Allergies   Allergen Reactions   • Penicillins Rash     Annotation - [id number]: HOwever he has been treated with Amoxicllin for sinusities with no problems. Social History   Past Surgical History:   Procedure Laterality Date   • NO PAST SURGERIES     • WISDOM TOOTH EXTRACTION     • WRIST SURGERY       Family History   Problem Relation Age of Onset   • Hypertension Mother    • Diabetes Father    • Hypertension Father    • Rheumatic fever Father    • Alcohol abuse Paternal Grandfather    • Heart failure Paternal Grandfather    • Substance Abuse Paternal Grandfather    • Diabetes Family        Objective:  /86 (BP Location: Left arm, Patient Position: Sitting, Cuff Size: Large)   Pulse 85   Temp 97.8 °F (36.6 °C) (Temporal)   Ht 6' (1.829 m)   Wt 123 kg (272 lb 3.2 oz)   SpO2 98%   BMI 36.92 kg/m²     Recent Results (from the past 1344 hour(s))   Hemoglobin A1C    Collection Time: 11/04/23  8:14 AM   Result Value Ref Range    Hemoglobin A1C 6.0 (H) Normal 4.0-5.6%; PreDiabetic 5.7-6.4%;  Diabetic >=6.5%; Glycemic control for adults with diabetes <7.0% %     mg/dl   Lipid Panel with Direct LDL reflex    Collection Time: 11/04/23  8:14 AM   Result Value Ref Range    Cholesterol 159 See Comment mg/dL    Triglycerides 69 See Comment mg/dL    HDL, Direct 37 (L) >=40 mg/dL    LDL Calculated 108 (H) 0 - 100 mg/dL   Basic metabolic panel    Collection Time: 11/04/23  8:14 AM   Result Value Ref Range    Sodium 136 135 - 147 mmol/L    Potassium 4.3 3.5 - 5.3 mmol/L    Chloride 101 96 - 108 mmol/L    CO2 29 21 - 32 mmol/L    ANION GAP 6 mmol/L    BUN 10 5 - 25 mg/dL    Creatinine 0.82 0.60 - 1.30 mg/dL    Glucose, Fasting 112 (H) 65 - 99 mg/dL    Calcium 9.1 8.4 - 10.2 mg/dL eGFR 108 ml/min/1.73sq m            Physical Exam  Vitals and nursing note reviewed. Constitutional:       General: He is not in acute distress. Appearance: He is well-developed. He is not diaphoretic. HENT:      Head: Normocephalic and atraumatic. Eyes:      General: No scleral icterus. Right eye: No discharge. Left eye: No discharge. Conjunctiva/sclera: Conjunctivae normal.      Pupils: Pupils are equal, round, and reactive to light. Neck:      Thyroid: No thyromegaly. Vascular: No JVD. Cardiovascular:      Rate and Rhythm: Normal rate and regular rhythm. Heart sounds: Normal heart sounds. No murmur heard. No friction rub. No gallop. Pulmonary:      Effort: Pulmonary effort is normal. No respiratory distress. Breath sounds: Normal breath sounds. No wheezing or rales. Chest:      Chest wall: No tenderness. Abdominal:      General: Bowel sounds are normal. There is no distension. Palpations: Abdomen is soft. There is no mass. Tenderness: There is no abdominal tenderness. There is no guarding or rebound. Musculoskeletal:         General: No tenderness or deformity. Normal range of motion. Cervical back: Normal range of motion and neck supple. Lymphadenopathy:      Cervical: No cervical adenopathy. Skin:     General: Skin is warm and dry. Coloration: Skin is not pale. Findings: No erythema or rash. Neurological:      Mental Status: He is alert and oriented to person, place, and time. Cranial Nerves: No cranial nerve deficit. Coordination: Coordination normal.      Deep Tendon Reflexes: Reflexes are normal and symmetric. Psychiatric:         Behavior: Behavior normal.         Thought Content:  Thought content normal.         Judgment: Judgment normal.

## 2023-11-10 NOTE — ASSESSMENT & PLAN NOTE
Discussed diet and exercise. He is up-to-date on lipid cardiovascular and diabetes screening. He is up-to-date on immunizations although would like to defer on influenza and COVID immunization at this time.

## 2024-02-02 ENCOUNTER — OFFICE VISIT (OUTPATIENT)
Dept: INTERNAL MEDICINE CLINIC | Facility: OTHER | Age: 44
End: 2024-02-02
Payer: COMMERCIAL

## 2024-02-02 ENCOUNTER — PATIENT MESSAGE (OUTPATIENT)
Dept: INTERNAL MEDICINE CLINIC | Facility: OTHER | Age: 44
End: 2024-02-02

## 2024-02-02 VITALS
SYSTOLIC BLOOD PRESSURE: 140 MMHG | HEIGHT: 72 IN | OXYGEN SATURATION: 98 % | HEART RATE: 96 BPM | BODY MASS INDEX: 36.84 KG/M2 | DIASTOLIC BLOOD PRESSURE: 90 MMHG | TEMPERATURE: 98.2 F | WEIGHT: 272 LBS

## 2024-02-02 DIAGNOSIS — I10 ESSENTIAL HYPERTENSION: ICD-10-CM

## 2024-02-02 DIAGNOSIS — J06.9 VIRAL UPPER RESPIRATORY ILLNESS: Primary | ICD-10-CM

## 2024-02-02 PROCEDURE — 99213 OFFICE O/P EST LOW 20 MIN: CPT | Performed by: NURSE PRACTITIONER

## 2024-02-02 NOTE — PROGRESS NOTES
Assessment/Plan:    Problem List Items Addressed This Visit       Essential hypertension     - above goal today likely secondary to decongestant.   - advised no congestants  - continues losartan-HCTZ 100-12.5mg daily          Viral upper respiratory illness - Primary     - covid negative x 2  - recommend flonase 2 sprays daily  - mucinex  - hydration and rest  - tylenol and ibuprofen prn  - recommend to follow up if symptoms continue to worsen after about 7 days                    M*Moozey software was used to dictate this note.  It may contain errors with dictating incorrect words or incorrect spelling. Please contact the provider directly with any questions.    Subjective:      Patient ID: Yonas Domínguez is a 44 y.o. male.    Patient presents today with concern for sinus symtpoms. Onset was 3 days ago. Symptoms include sinus pain around his left eye and pressure in his left ear. +post nasal drip.   He did take vicks sinus max which did open his ear.   He denies any fevers.   He was tested twice at work for covid and was negative yesterday and this morning.     The following portions of the patient's history were reviewed and updated as appropriate: allergies, current medications, past family history, past medical history, past social history, past surgical history, and problem list.    Review of Systems   Constitutional:  Negative for chills and fever.   HENT:  Positive for congestion, ear pain (pressure left ear), postnasal drip, rhinorrhea and sinus pressure. Negative for sore throat.    Respiratory:  Negative for cough, chest tightness, shortness of breath and wheezing.    Neurological:  Negative for headaches.         Past Medical History:   Diagnosis Date    Anxiety 2/23/2021    Class 1 obesity due to excess calories without serious comorbidity with body mass index (BMI) of 34.0 to 34.9 in adult 9/25/2018    Essential hypertension 11/6/2018    Grief reaction 4/22/2022    Hematuria 5/30/2019    Kidney  stone     Kidney stone 5/30/2019    Pneumonia          Current Outpatient Medications:     losartan-hydrochlorothiazide (HYZAAR) 100-12.5 MG per tablet, Take 1 tablet by mouth daily, Disp: 90 tablet, Rfl: 1    simvastatin (ZOCOR) 10 mg tablet, Take 1 tablet (10 mg total) by mouth daily at bedtime, Disp: 90 tablet, Rfl: 1    Allergies   Allergen Reactions    Penicillins Rash     Annotation - [id number]: HOwever he has been treated with Amoxicllin for sinusities with no problems.       Social History   Past Surgical History:   Procedure Laterality Date    NO PAST SURGERIES      WISDOM TOOTH EXTRACTION      WRIST SURGERY       Family History   Problem Relation Age of Onset    Hypertension Mother     Diabetes Father     Hypertension Father     Rheumatic fever Father     Alcohol abuse Paternal Grandfather     Heart failure Paternal Grandfather     Substance Abuse Paternal Grandfather     Diabetes Family        Objective:  /90 (BP Location: Left arm, Patient Position: Sitting, Cuff Size: Large)   Pulse 96   Temp 98.2 °F (36.8 °C)   Ht 6' (1.829 m)   Wt 123 kg (272 lb)   SpO2 98%   BMI 36.89 kg/m²      Physical Exam  Vitals reviewed.   Constitutional:       General: He is not in acute distress.     Appearance: Normal appearance. He is obese. He is not diaphoretic.   HENT:      Head: Normocephalic and atraumatic.      Right Ear: Tympanic membrane and external ear normal.      Left Ear: Tympanic membrane and external ear normal.      Nose: Rhinorrhea present.      Right Sinus: No maxillary sinus tenderness or frontal sinus tenderness.      Left Sinus: Frontal sinus tenderness present. No maxillary sinus tenderness.      Mouth/Throat:      Mouth: Mucous membranes are moist.      Pharynx: Oropharynx is clear. No oropharyngeal exudate or posterior oropharyngeal erythema.   Eyes:      Extraocular Movements: Extraocular movements intact.      Conjunctiva/sclera: Conjunctivae normal.      Pupils: Pupils are equal,  round, and reactive to light.   Cardiovascular:      Rate and Rhythm: Normal rate and regular rhythm.      Heart sounds: Normal heart sounds. No murmur heard.  Pulmonary:      Effort: Pulmonary effort is normal. No respiratory distress.      Breath sounds: Normal breath sounds. No wheezing, rhonchi or rales.   Lymphadenopathy:      Cervical: No cervical adenopathy.   Neurological:      Mental Status: He is alert and oriented to person, place, and time. Mental status is at baseline.   Psychiatric:         Mood and Affect: Mood normal.         Behavior: Behavior normal.

## 2024-02-02 NOTE — ASSESSMENT & PLAN NOTE
- covid negative x 2  - recommend flonase 2 sprays daily  - mucinex  - hydration and rest  - tylenol and ibuprofen prn  - recommend to follow up if symptoms continue to worsen after about 7 days

## 2024-02-21 PROBLEM — J06.9 VIRAL UPPER RESPIRATORY ILLNESS: Status: RESOLVED | Noted: 2024-02-02 | Resolved: 2024-02-21

## 2024-04-20 ENCOUNTER — AMB VIDEO VISIT (OUTPATIENT)
Dept: OTHER | Facility: HOSPITAL | Age: 44
End: 2024-04-20

## 2024-04-20 DIAGNOSIS — J01.91 ACUTE RECURRENT SINUSITIS, UNSPECIFIED LOCATION: Primary | ICD-10-CM

## 2024-04-20 PROCEDURE — ECARE PR SL URGENT CARE VIRTUAL VISIT: Performed by: PHYSICIAN ASSISTANT

## 2024-04-20 RX ORDER — AZITHROMYCIN 250 MG/1
TABLET, FILM COATED ORAL DAILY
Qty: 6 TABLET | Refills: 0 | Status: SHIPPED | OUTPATIENT
Start: 2024-04-20 | End: 2024-04-25

## 2024-04-20 NOTE — CARE ANYWHERE EVISITS
Visit Summary for RAÚL KEMP - Gender: Male - Date of Birth: 1980  Date: [id number] - Duration: 4 minutes  Patient: RAÚL KEMP  Provider: Cindi Beltran PA-C    Patient Contact Information  Address  [address] [place] [address] [place]; [place] [address]  [phone]    Visit Topics  Cold [Added By: Self - 2024-04-20]    Triage Questions   What is your current physical address in the event of a medical emergency? Answer []  Are you allergic to any medications? Answer []  Are you now or could you be pregnant? Answer []  Do you have any immune system compromise or chronic lung   disease? Answer []  Do you have any vulnerable family members in the home (infant, pregnant, cancer, elderly)? Answer []     Conversation Transcripts  [0A][0A] [Notification] You are connected with Cindi Beltran PA-C, Urgent Care Specialist.[0A][Notification] RAÚL KEMP is located in [place].[0A][Notification] RAÚL KEMP has shared health history...[0A]    Diagnosis  Acute sinusitis    Procedures  Value: [id number] Code: CPT-4 UNLISTED E&M SERVICE    Medications Prescribed    No prescriptions ordered    Provider Notes  [0A][0A] Continue with supportive care including but not limited to Tylenol/Motrin as needed for pain and fever, drink plenty of fluids for adequate hydration, can add flonase to help with postnasal drip and congestion, can take  Follow up with PCP if no   improvement in symptoms in 48hrs. Go to ER if worsening symptoms, development of inability to eat or drink, shortness of breath, dizziness, blurry vision, fainting, chest pain, headache or fever not relieved with motrin or tylenol, worsening   symptoms[0A]    Electronically signed by: Cindi Beltran PA-C(NPI [id number])

## 2024-04-20 NOTE — PROGRESS NOTES
Required Documentation:  Encounter provider Cindi Beltran PA-C    Provider located remotely    Identify all parties in room with patient during virtual visit:  No one else    The patient was identified by name and date of birth. Yonas Domínguez was informed that this is a telemedicine visit and that the visit is being conducted through the [employer] platform. He agrees to proceed..  My office door was closed. No one else was in the room.  He acknowledged consent and understanding of privacy and security of the video platform. The patient has agreed to participate and understands they can discontinue the visit at any time.    Verification of patient location:    Patient is located at Home in the following state in which I hold an active license [place]  patient is aware this is a billable service.     Reason for visit is No chief complaint on file.       Subjective  44 y.o. male presents for evaluation of 1 week of worsening congestion, headache, sinus pain and pressure.   Over last 2 days worsening pain, pressure, now with ear pain, teeth pain and low grade fever. Denies N/V/D, CP, SOB, blurry vision, dizziness or fainting,            Past Medical History:   Diagnosis Date    Anxiety 2/23/2021    Class 1 obesity due to excess calories without serious comorbidity with body mass index (BMI) of 34.0 to 34.9 in adult 9/25/2018    Essential hypertension 11/6/2018    Grief reaction 4/22/2022    Hematuria 5/30/2019    Kidney stone     Kidney stone 5/30/2019    Pneumonia        Past Surgical History:   Procedure Laterality Date    NO PAST SURGERIES      WISDOM TOOTH EXTRACTION      WRIST SURGERY          Allergies   Allergen Reactions    Penicillins Rash     Annotation - [id number]: HOwever he has been treated with Amoxicllin for sinusities with no problems.       Review of Systems    Video Exam    There were no vitals filed for this visit.    Physical Exam  Constitutional:       General: He is not in acute  distress.     Appearance: Normal appearance. He is not ill-appearing or toxic-appearing.   HENT:      Head: Normocephalic and atraumatic.      Right Ear: External ear normal.      Left Ear: External ear normal.      Nose: Congestion present.      Mouth/Throat:      Mouth: Mucous membranes are moist.   Eyes:      Extraocular Movements: Extraocular movements intact.      Conjunctiva/sclera: Conjunctivae normal.   Pulmonary:      Effort: Pulmonary effort is normal.      Comments: No observable or audible signs of distress, no nasal flaring, grunting, accessory muscle use, audible wheezes, stridor, tripoding  Neurological:      Mental Status: He is alert and oriented to person, place, and time. Mental status is at baseline.   Psychiatric:         Mood and Affect: Mood normal.         Behavior: Behavior normal.         Thought Content: Thought content normal.         Judgment: Judgment normal.         Visit Time  Total Visit Duration: 15 minutes    Assessment/Plan:    Diagnoses and all orders for this visit:    Acute recurrent sinusitis, unspecified location  -     azithromycin (Zithromax) 250 mg tablet; Take 2 tablets (500 mg total) by mouth daily for 1 day, THEN 1 tablet (250 mg total) daily for 4 days.        Patient Instructions   Continue with supportive care including but not limited to plenty of fluids to maintain adequate hydration, Tylenol/Motrin as needed for pain and fever, Nasal sprays such as Flonase for congestion and post nasal drip, ; Take all medications as directed follow up with PCP in 48hrs if  no improvement in symptoms. Go to ER if worsening symptoms, development of dizziness, headache, blurry vision, shortness of breath, chest pain, inability to eat or drink, fainting, headache or fever not improved with tylenol or motrin.

## 2024-04-20 NOTE — PATIENT INSTRUCTIONS
Continue with supportive care including but not limited to plenty of fluids to maintain adequate hydration, Tylenol/Motrin as needed for pain and fever, Nasal sprays such as Flonase for congestion and post nasal drip, ; Take all medications as directed follow up with PCP in 48hrs if  no improvement in symptoms. Go to ER if worsening symptoms, development of dizziness, headache, blurry vision, shortness of breath, chest pain, inability to eat or drink, fainting, headache or fever not improved with tylenol or motrin.

## 2024-05-10 ENCOUNTER — OFFICE VISIT (OUTPATIENT)
Dept: INTERNAL MEDICINE CLINIC | Facility: OTHER | Age: 44
End: 2024-05-10
Payer: COMMERCIAL

## 2024-05-10 VITALS
OXYGEN SATURATION: 99 % | TEMPERATURE: 99 F | WEIGHT: 277.4 LBS | DIASTOLIC BLOOD PRESSURE: 80 MMHG | BODY MASS INDEX: 37.62 KG/M2 | HEART RATE: 90 BPM | SYSTOLIC BLOOD PRESSURE: 126 MMHG

## 2024-05-10 DIAGNOSIS — E78.00 HYPERCHOLESTEROLEMIA: ICD-10-CM

## 2024-05-10 DIAGNOSIS — I10 ESSENTIAL HYPERTENSION: Primary | ICD-10-CM

## 2024-05-10 DIAGNOSIS — Z13.0 SCREENING FOR DEFICIENCY ANEMIA: ICD-10-CM

## 2024-05-10 DIAGNOSIS — R73.03 PREDIABETES: ICD-10-CM

## 2024-05-10 PROCEDURE — 99214 OFFICE O/P EST MOD 30 MIN: CPT | Performed by: NURSE PRACTITIONER

## 2024-05-10 NOTE — PROGRESS NOTES
Assessment/Plan:    Problem List Items Addressed This Visit       Essential hypertension - Primary     Controlled on losartan-HCTZ 100-12.5mg daily   Recommend low sodium diet and routine exercise  Continue monitoring BP occasionally at home  Follow up 6 months          Relevant Orders    Comprehensive metabolic panel    Hypercholesterolemia     continue simvastatin 10mg daily  Update lipid panel prior to follow up  Continue healthy diet and routine exercise           Relevant Orders    Comprehensive metabolic panel    Lipid Panel with Direct LDL reflex    TSH, 3rd generation with Free T4 reflex    Prediabetes     Last Hba1c 6.0  Repeat in 6 months  Encouraged healthy diet, high in fruits and vegetables, lean protein  Routine exercise             Relevant Orders    Hemoglobin A1C     Other Visit Diagnoses       Screening for deficiency anemia        Relevant Orders    CBC and differential                 M*Modal software was used to dictate this note.  It may contain errors with dictating incorrect words or incorrect spelling. Please contact the provider directly with any questions.    Subjective:      Patient ID: Yonas Domínguez is a 44 y.o. male.    HPI    Patient presents today for 6 month follow up  No new labs for review     HTN- controlled on losartan-HCTZ 100-12.5mg daily. He is monitoring periodically at home. He is not following low sodium diet.     HLD- managed on simvastatin 10mg daily. Compliant with medication.       The following portions of the patient's history were reviewed and updated as appropriate: allergies, current medications, past family history, past medical history, past social history, past surgical history, and problem list.    Review of Systems   Respiratory:  Negative for cough, chest tightness and shortness of breath.    Cardiovascular:  Negative for chest pain and palpitations.         Past Medical History:   Diagnosis Date    Anxiety 2/23/2021    Class 1 obesity due to excess  calories without serious comorbidity with body mass index (BMI) of 34.0 to 34.9 in adult 9/25/2018    Essential hypertension 11/6/2018    Grief reaction 4/22/2022    Hematuria 5/30/2019    Kidney stone     Kidney stone 5/30/2019    Pneumonia          Current Outpatient Medications:     losartan-hydrochlorothiazide (HYZAAR) 100-12.5 MG per tablet, Take 1 tablet by mouth daily, Disp: 90 tablet, Rfl: 1    simvastatin (ZOCOR) 10 mg tablet, Take 1 tablet (10 mg total) by mouth daily at bedtime, Disp: 90 tablet, Rfl: 1    Allergies   Allergen Reactions    Penicillins Rash     Annotation - [id number]: HOwever he has been treated with Amoxicllin for sinusities with no problems.       Social History   Past Surgical History:   Procedure Laterality Date    NO PAST SURGERIES      WISDOM TOOTH EXTRACTION      WRIST SURGERY       Family History   Problem Relation Age of Onset    Hypertension Mother     Diabetes Father     Hypertension Father     Rheumatic fever Father     Alcohol abuse Paternal Grandfather     Heart failure Paternal Grandfather     Substance Abuse Paternal Grandfather     Diabetes Family        Objective:  /80 (BP Location: Left arm, Patient Position: Sitting, Cuff Size: Large)   Pulse 90   Temp 99 °F (37.2 °C) (Temporal)   Wt 126 kg (277 lb 6.4 oz)   SpO2 99%   BMI 37.62 kg/m²      Physical Exam  Vitals reviewed.   Constitutional:       General: He is not in acute distress.     Appearance: Normal appearance. He is obese. He is not diaphoretic.   HENT:      Head: Normocephalic and atraumatic.   Eyes:      Extraocular Movements: Extraocular movements intact.      Conjunctiva/sclera: Conjunctivae normal.      Pupils: Pupils are equal, round, and reactive to light.   Neck:      Vascular: No carotid bruit.   Cardiovascular:      Rate and Rhythm: Normal rate and regular rhythm.      Heart sounds: Normal heart sounds. No murmur heard.  Pulmonary:      Effort: Pulmonary effort is normal. No respiratory  distress.      Breath sounds: Normal breath sounds. No wheezing, rhonchi or rales.   Musculoskeletal:      Right lower leg: No edema.      Left lower leg: No edema.   Neurological:      Mental Status: He is alert and oriented to person, place, and time. Mental status is at baseline.   Psychiatric:         Mood and Affect: Mood normal.         Behavior: Behavior normal.         Thought Content: Thought content normal.         Judgment: Judgment normal.

## 2024-05-10 NOTE — ASSESSMENT & PLAN NOTE
Controlled on losartan-HCTZ 100-12.5mg daily   Recommend low sodium diet and routine exercise  Continue monitoring BP occasionally at home  Follow up 6 months

## 2024-05-10 NOTE — ASSESSMENT & PLAN NOTE
continue simvastatin 10mg daily  Update lipid panel prior to follow up  Continue healthy diet and routine exercise

## 2024-05-10 NOTE — ASSESSMENT & PLAN NOTE
Last Hba1c 6.0  Repeat in 6 months  Encouraged healthy diet, high in fruits and vegetables, lean protein  Routine exercise

## 2024-05-19 DIAGNOSIS — E78.00 HYPERCHOLESTEROLEMIA: ICD-10-CM

## 2024-05-19 DIAGNOSIS — I10 ESSENTIAL HYPERTENSION: ICD-10-CM

## 2024-05-20 RX ORDER — LOSARTAN POTASSIUM AND HYDROCHLOROTHIAZIDE 12.5; 1 MG/1; MG/1
1 TABLET ORAL DAILY
Qty: 90 TABLET | Refills: 0 | Status: SHIPPED | OUTPATIENT
Start: 2024-05-20

## 2024-05-20 RX ORDER — SIMVASTATIN 10 MG
10 TABLET ORAL
Qty: 90 TABLET | Refills: 0 | Status: SHIPPED | OUTPATIENT
Start: 2024-05-20

## 2024-07-21 DIAGNOSIS — E78.00 HYPERCHOLESTEROLEMIA: ICD-10-CM

## 2024-07-21 DIAGNOSIS — I10 ESSENTIAL HYPERTENSION: ICD-10-CM

## 2024-07-21 RX ORDER — LOSARTAN POTASSIUM AND HYDROCHLOROTHIAZIDE 12.5; 1 MG/1; MG/1
1 TABLET ORAL DAILY
Qty: 90 TABLET | Refills: 1 | Status: SHIPPED | OUTPATIENT
Start: 2024-07-21

## 2024-07-21 RX ORDER — SIMVASTATIN 10 MG
10 TABLET ORAL
Qty: 90 TABLET | Refills: 1 | Status: SHIPPED | OUTPATIENT
Start: 2024-07-21

## 2024-08-28 DIAGNOSIS — I10 ESSENTIAL HYPERTENSION: ICD-10-CM

## 2024-08-28 RX ORDER — LOSARTAN POTASSIUM AND HYDROCHLOROTHIAZIDE 12.5; 1 MG/1; MG/1
1 TABLET ORAL DAILY
Qty: 90 TABLET | Refills: 1 | Status: SHIPPED | OUTPATIENT
Start: 2024-08-28

## 2024-11-16 ENCOUNTER — APPOINTMENT (OUTPATIENT)
Dept: LAB | Facility: IMAGING CENTER | Age: 44
End: 2024-11-16
Payer: COMMERCIAL

## 2024-11-16 DIAGNOSIS — R73.03 PREDIABETES: ICD-10-CM

## 2024-11-16 DIAGNOSIS — E78.00 HYPERCHOLESTEROLEMIA: ICD-10-CM

## 2024-11-16 DIAGNOSIS — Z13.0 SCREENING FOR DEFICIENCY ANEMIA: ICD-10-CM

## 2024-11-16 DIAGNOSIS — I10 ESSENTIAL HYPERTENSION: ICD-10-CM

## 2024-11-16 LAB
ALBUMIN SERPL BCG-MCNC: 4.2 G/DL (ref 3.5–5)
ALP SERPL-CCNC: 83 U/L (ref 34–104)
ALT SERPL W P-5'-P-CCNC: 46 U/L (ref 7–52)
ANION GAP SERPL CALCULATED.3IONS-SCNC: 7 MMOL/L (ref 4–13)
AST SERPL W P-5'-P-CCNC: 24 U/L (ref 13–39)
BASOPHILS # BLD AUTO: 0.05 THOUSANDS/ÂΜL (ref 0–0.1)
BASOPHILS NFR BLD AUTO: 1 % (ref 0–1)
BILIRUB SERPL-MCNC: 0.4 MG/DL (ref 0.2–1)
BUN SERPL-MCNC: 13 MG/DL (ref 5–25)
CALCIUM SERPL-MCNC: 8.9 MG/DL (ref 8.4–10.2)
CHLORIDE SERPL-SCNC: 104 MMOL/L (ref 96–108)
CHOLEST SERPL-MCNC: 152 MG/DL (ref ?–200)
CO2 SERPL-SCNC: 29 MMOL/L (ref 21–32)
CREAT SERPL-MCNC: 0.76 MG/DL (ref 0.6–1.3)
EOSINOPHIL # BLD AUTO: 0.23 THOUSAND/ÂΜL (ref 0–0.61)
EOSINOPHIL NFR BLD AUTO: 4 % (ref 0–6)
ERYTHROCYTE [DISTWIDTH] IN BLOOD BY AUTOMATED COUNT: 13.9 % (ref 11.6–15.1)
EST. AVERAGE GLUCOSE BLD GHB EST-MCNC: 128 MG/DL
GFR SERPL CREATININE-BSD FRML MDRD: 110 ML/MIN/1.73SQ M
GLUCOSE P FAST SERPL-MCNC: 108 MG/DL (ref 65–99)
HBA1C MFR BLD: 6.1 %
HCT VFR BLD AUTO: 46.7 % (ref 36.5–49.3)
HDLC SERPL-MCNC: 36 MG/DL
HGB BLD-MCNC: 15.3 G/DL (ref 12–17)
IMM GRANULOCYTES # BLD AUTO: 0.02 THOUSAND/UL (ref 0–0.2)
IMM GRANULOCYTES NFR BLD AUTO: 0 % (ref 0–2)
LDLC SERPL CALC-MCNC: 101 MG/DL (ref 0–100)
LYMPHOCYTES # BLD AUTO: 2.47 THOUSANDS/ÂΜL (ref 0.6–4.47)
LYMPHOCYTES NFR BLD AUTO: 38 % (ref 14–44)
MCH RBC QN AUTO: 29.3 PG (ref 26.8–34.3)
MCHC RBC AUTO-ENTMCNC: 32.8 G/DL (ref 31.4–37.4)
MCV RBC AUTO: 90 FL (ref 82–98)
MONOCYTES # BLD AUTO: 0.61 THOUSAND/ÂΜL (ref 0.17–1.22)
MONOCYTES NFR BLD AUTO: 10 % (ref 4–12)
NEUTROPHILS # BLD AUTO: 3.06 THOUSANDS/ÂΜL (ref 1.85–7.62)
NEUTS SEG NFR BLD AUTO: 47 % (ref 43–75)
NRBC BLD AUTO-RTO: 0 /100 WBCS
PLATELET # BLD AUTO: 309 THOUSANDS/UL (ref 149–390)
PMV BLD AUTO: 10.1 FL (ref 8.9–12.7)
POTASSIUM SERPL-SCNC: 4.4 MMOL/L (ref 3.5–5.3)
PROT SERPL-MCNC: 7 G/DL (ref 6.4–8.4)
RBC # BLD AUTO: 5.22 MILLION/UL (ref 3.88–5.62)
SODIUM SERPL-SCNC: 140 MMOL/L (ref 135–147)
TRIGL SERPL-MCNC: 73 MG/DL (ref ?–150)
TSH SERPL DL<=0.05 MIU/L-ACNC: 3.05 UIU/ML (ref 0.45–4.5)
WBC # BLD AUTO: 6.44 THOUSAND/UL (ref 4.31–10.16)

## 2024-11-16 PROCEDURE — 80061 LIPID PANEL: CPT

## 2024-11-16 PROCEDURE — 84443 ASSAY THYROID STIM HORMONE: CPT

## 2024-11-16 PROCEDURE — 36415 COLL VENOUS BLD VENIPUNCTURE: CPT

## 2024-11-16 PROCEDURE — 83036 HEMOGLOBIN GLYCOSYLATED A1C: CPT

## 2024-11-16 PROCEDURE — 80053 COMPREHEN METABOLIC PANEL: CPT

## 2024-11-16 PROCEDURE — 85025 COMPLETE CBC W/AUTO DIFF WBC: CPT

## 2024-11-18 ENCOUNTER — RESULTS FOLLOW-UP (OUTPATIENT)
Dept: INTERNAL MEDICINE CLINIC | Facility: OTHER | Age: 44
End: 2024-11-18

## 2024-11-22 ENCOUNTER — OFFICE VISIT (OUTPATIENT)
Dept: INTERNAL MEDICINE CLINIC | Facility: OTHER | Age: 44
End: 2024-11-22
Payer: COMMERCIAL

## 2024-11-22 VITALS
SYSTOLIC BLOOD PRESSURE: 130 MMHG | TEMPERATURE: 98.4 F | DIASTOLIC BLOOD PRESSURE: 94 MMHG | HEART RATE: 95 BPM | HEIGHT: 72 IN | BODY MASS INDEX: 38.87 KG/M2 | WEIGHT: 287 LBS | OXYGEN SATURATION: 97 %

## 2024-11-22 DIAGNOSIS — I10 ESSENTIAL HYPERTENSION: Primary | ICD-10-CM

## 2024-11-22 DIAGNOSIS — R73.03 PREDIABETES: ICD-10-CM

## 2024-11-22 DIAGNOSIS — E78.00 HYPERCHOLESTEROLEMIA: ICD-10-CM

## 2024-11-22 PROBLEM — Z00.00 ANNUAL PHYSICAL EXAM: Status: RESOLVED | Noted: 2018-09-25 | Resolved: 2024-11-22

## 2024-11-22 PROCEDURE — 99214 OFFICE O/P EST MOD 30 MIN: CPT | Performed by: NURSE PRACTITIONER

## 2024-11-22 RX ORDER — VALSARTAN AND HYDROCHLOROTHIAZIDE 320; 25 MG/1; MG/1
1 TABLET, FILM COATED ORAL DAILY
Qty: 90 TABLET | Refills: 1 | Status: SHIPPED | OUTPATIENT
Start: 2024-11-22

## 2024-11-22 NOTE — PROGRESS NOTES
Assessment/Plan:    Problem List Items Addressed This Visit       Essential hypertension - Primary    Discontinue losartan hydrochlorothiazide  Start valsartan hydrochlorothiazide 320-25 mg daily  Check BMP in 2 to 3 weeks  Continue low-sodium diet  Continue routine exercise  Continue monitoring blood pressure at home.  Parameters given to contact office if consistently greater than 130/85         Relevant Medications    valsartan-hydrochlorothiazide (DIOVAN-HCT) 320-25 MG per tablet    Other Relevant Orders    Basic metabolic panel    Hypercholesterolemia    Continue simvastatin 10 mg daily  Continue healthy diet and routine exercise  ASCVD risk 2.1%         Prediabetes    Worsening  Hemoglobin A1c 6.1  Discussed diet modifications.  Cut out all soda.   Continue routine exercise  Repeat in 6 months            BMI Counseling: Body mass index is 38.92 kg/m².          [employer] software was used to dictate this note.  It may contain errors with dictating incorrect words or incorrect spelling. Please contact the provider directly with any questions.    Subjective:      Patient ID: Yonas Domínguez is a 44 y.o. male.    HPI    Patient presents today for 6 month follow up  He states 4 months ago he started working out by lifting weights and boxing 3-4 times a week. He states his weight was up to 295lbs and he has lost about 10 lbs, overall gain of 10 lbs in the last 6 months.   He denies any supplements.   No breakfast  Salad for lunch- kale, vegetables and dressing  Dinner - poor dinner.     Hba1c 6.1, fasting glucose 108    HTN- home readings are low 120s/high 80s. He is currently on losartan-HCTZ 100-12.5    HLD-total cholesterol 152, triglycerides 73, HDL 36,   The 10-year ASCVD risk score (Maricruz CANO, et al., 2019) is: 2.1%    Values used to calculate the score:      Age: 44 years      Sex: Male      Is Non- : No      Diabetic: No      Tobacco smoker: No      Systolic Blood Pressure:  130 mmHg      Is BP treated: Yes      HDL Cholesterol: 36 mg/dL      Total Cholesterol: 152 mg/dL        The following portions of the patient's history were reviewed and updated as appropriate: allergies, current medications, past family history, past medical history, past social history, past surgical history, and problem list.    Review of Systems   Constitutional:  Negative for activity change, appetite change and unexpected weight change.   Eyes:  Negative for visual disturbance.   Respiratory:  Negative for cough, chest tightness, shortness of breath and wheezing.    Cardiovascular:  Negative for chest pain, palpitations and leg swelling.   Neurological:  Negative for headaches.         Past Medical History:   Diagnosis Date    Anxiety 2/23/2021    Class 1 obesity due to excess calories without serious comorbidity with body mass index (BMI) of 34.0 to 34.9 in adult 9/25/2018    Essential hypertension 11/6/2018    Grief reaction 4/22/2022    Hematuria 5/30/2019    Kidney stone     Kidney stone 5/30/2019    Pneumonia          Current Outpatient Medications:     simvastatin (ZOCOR) 10 mg tablet, Take 1 tablet (10 mg total) by mouth daily at bedtime, Disp: 90 tablet, Rfl: 1    valsartan-hydrochlorothiazide (DIOVAN-HCT) 320-25 MG per tablet, Take 1 tablet by mouth daily, Disp: 90 tablet, Rfl: 1    Allergies   Allergen Reactions    Penicillins Rash     Annotation - [id number]: HOwever he has been treated with Amoxicllin for sinusities with no problems.       Social History   Past Surgical History:   Procedure Laterality Date    NO PAST SURGERIES      WISDOM TOOTH EXTRACTION      WRIST SURGERY       Family History   Problem Relation Age of Onset    Hypertension Mother     Diabetes Father     Hypertension Father     Rheumatic fever Father     Alcohol abuse Paternal Grandfather     Heart failure Paternal Grandfather     Substance Abuse Paternal Grandfather     Diabetes Family        Objective:  /94 (BP Location:  Left arm, Patient Position: Sitting, Cuff Size: Adult)   Pulse 95   Temp 98.4 °F (36.9 °C) (Temporal)   Ht 6' (1.829 m)   Wt 130 kg (287 lb)   SpO2 97%   BMI 38.92 kg/m²      Physical Exam  Vitals reviewed.   Constitutional:       General: He is not in acute distress.     Appearance: Normal appearance. He is obese. He is not diaphoretic.   HENT:      Head: Normocephalic and atraumatic.   Eyes:      Extraocular Movements: Extraocular movements intact.      Conjunctiva/sclera: Conjunctivae normal.      Pupils: Pupils are equal, round, and reactive to light.   Cardiovascular:      Rate and Rhythm: Normal rate and regular rhythm.      Heart sounds: Normal heart sounds. No murmur heard.  Pulmonary:      Effort: Pulmonary effort is normal. No respiratory distress.      Breath sounds: Normal breath sounds. No wheezing, rhonchi or rales.   Musculoskeletal:      Right lower leg: No edema.      Left lower leg: No edema.   Neurological:      Mental Status: He is alert and oriented to person, place, and time. Mental status is at baseline.   Psychiatric:         Mood and Affect: Mood normal.         Behavior: Behavior normal.         Thought Content: Thought content normal.         Judgment: Judgment normal.

## 2024-11-22 NOTE — ASSESSMENT & PLAN NOTE
Discontinue losartan hydrochlorothiazide  Start valsartan hydrochlorothiazide 320-25 mg daily  Check BMP in 2 to 3 weeks  Continue low-sodium diet  Continue routine exercise  Continue monitoring blood pressure at home.  Parameters given to contact office if consistently greater than 130/85

## 2024-11-22 NOTE — ASSESSMENT & PLAN NOTE
Worsening  Hemoglobin A1c 6.1  Discussed diet modifications.  Cut out all soda.   Continue routine exercise  Repeat in 6 months

## 2024-11-22 NOTE — PATIENT INSTRUCTIONS
Notify me if blood pressure is consistently greater than 130/85   Go for blood work in 2-3 weeks     Follow up in 6 months

## 2024-12-20 ENCOUNTER — TELEPHONE (OUTPATIENT)
Dept: INTERNAL MEDICINE CLINIC | Facility: OTHER | Age: 44
End: 2024-12-20

## 2024-12-20 ENCOUNTER — APPOINTMENT (OUTPATIENT)
Dept: LAB | Facility: IMAGING CENTER | Age: 44
End: 2024-12-20
Payer: COMMERCIAL

## 2024-12-20 DIAGNOSIS — I10 ESSENTIAL HYPERTENSION: ICD-10-CM

## 2024-12-20 LAB
ANION GAP SERPL CALCULATED.3IONS-SCNC: 4 MMOL/L (ref 4–13)
BUN SERPL-MCNC: 12 MG/DL (ref 5–25)
CALCIUM SERPL-MCNC: 9 MG/DL (ref 8.4–10.2)
CHLORIDE SERPL-SCNC: 99 MMOL/L (ref 96–108)
CO2 SERPL-SCNC: 33 MMOL/L (ref 21–32)
CREAT SERPL-MCNC: 0.78 MG/DL (ref 0.6–1.3)
GFR SERPL CREATININE-BSD FRML MDRD: 109 ML/MIN/1.73SQ M
GLUCOSE P FAST SERPL-MCNC: 91 MG/DL (ref 65–99)
POTASSIUM SERPL-SCNC: 3.9 MMOL/L (ref 3.5–5.3)
SODIUM SERPL-SCNC: 136 MMOL/L (ref 135–147)

## 2024-12-20 PROCEDURE — 36415 COLL VENOUS BLD VENIPUNCTURE: CPT

## 2024-12-20 PROCEDURE — 80048 BASIC METABOLIC PNL TOTAL CA: CPT

## 2024-12-20 NOTE — TELEPHONE ENCOUNTER
----- Message from JO Angeles sent at 12/19/2024  4:04 PM EST -----  Please call pt to remind him to go for follow up labs   Thanks!  ----- Message -----  From: JO Bowie  Sent: 11/22/2024  10:19 AM EST  To: JO Bowie    Order 6 month labs after reviewing bmp

## 2025-01-02 ENCOUNTER — RESULTS FOLLOW-UP (OUTPATIENT)
Dept: INTERNAL MEDICINE CLINIC | Facility: OTHER | Age: 45
End: 2025-01-02

## 2025-01-02 DIAGNOSIS — E78.00 HYPERCHOLESTEROLEMIA: ICD-10-CM

## 2025-01-02 DIAGNOSIS — R73.03 PREDIABETES: ICD-10-CM

## 2025-01-02 DIAGNOSIS — I10 ESSENTIAL HYPERTENSION: Primary | ICD-10-CM

## 2025-01-27 DIAGNOSIS — E78.00 HYPERCHOLESTEROLEMIA: ICD-10-CM

## 2025-01-28 RX ORDER — SIMVASTATIN 10 MG
10 TABLET ORAL
Qty: 90 TABLET | Refills: 1 | Status: SHIPPED | OUTPATIENT
Start: 2025-01-28

## 2025-04-05 ENCOUNTER — TELEMEDICINE (OUTPATIENT)
Dept: OTHER | Facility: HOSPITAL | Age: 45
End: 2025-04-05
Payer: COMMERCIAL

## 2025-04-05 DIAGNOSIS — J01.40 ACUTE NON-RECURRENT PANSINUSITIS: Primary | ICD-10-CM

## 2025-04-05 PROCEDURE — 99213 OFFICE O/P EST LOW 20 MIN: CPT | Performed by: PHYSICIAN ASSISTANT

## 2025-04-05 RX ORDER — DOXYCYCLINE HYCLATE 100 MG
100 TABLET ORAL 2 TIMES DAILY
Qty: 14 TABLET | Refills: 0 | Status: SHIPPED | OUTPATIENT
Start: 2025-04-05 | End: 2025-04-12

## 2025-04-05 NOTE — PROGRESS NOTES
Virtual Regular Visit  Name: Yonas Domínguez      : 1980      MRN: [id number]  Encounter Provider: Shannon D Severino, PA-C  Encounter Date: 2025   Encounter department: VIRTUAL CARE       Verification of patient location:  Patient is located at Home in the following state in which I hold an active license [place] :  Assessment & Plan  Acute non-recurrent pansinusitis    Orders:    doxycycline hyclate (VIBRA-TABS) 100 mg tablet; Take 1 tablet (100 mg total) by mouth 2 (two) times a day for 7 days        Encounter provider Shannon D Severino, PA-C    The patient was identified by name and date of birth. Yonas Domínguez was informed that this is a telemedicine visit and that the visit is being conducted through the [employer] platform. He agrees to proceed..  My office door was closed. No one else was in the room.  He acknowledged consent and understanding of privacy and security of the video platform. The patient has agreed to participate and understands they can discontinue the visit at any time.    Patient is aware this is a billable service.     History obtained from: patient and patient's Significant Other  History of Present Illness     Pt reports sinus infection. Hx chronic sinusitis which has been better with Neti Pot. Has head congestion and green rhinorrhea, pressure in ethmoid to maxillary region, cough from PND. Taking mucinex-D, ibuprofen with some relief. Sx worse at night and first thing in the morning. No fevers, SOB, CP.       Review of Systems   Constitutional:  Negative for fever.   HENT:  Positive for congestion, postnasal drip, sinus pressure and sinus pain. Negative for nosebleeds.    Eyes:  Negative for redness.   Respiratory:  Positive for cough. Negative for shortness of breath.    Cardiovascular:  Negative for chest pain.   Gastrointestinal:  Negative for blood in stool.   Genitourinary:  Negative for hematuria.   Musculoskeletal:  Negative for gait problem.   Skin:   Negative for rash.   Neurological:  Negative for seizures.   Psychiatric/Behavioral:  Negative for behavioral problems.        Objective   There were no vitals taken for this visit.    Physical Exam  Constitutional:       General: He is not in acute distress.     Appearance: Normal appearance. He is obese. He is not toxic-appearing.   HENT:      Head: Normocephalic and atraumatic.      Nose: No rhinorrhea.      Mouth/Throat:      Mouth: Mucous membranes are moist.   Eyes:      Conjunctiva/sclera: Conjunctivae normal.      Comments: glasses   Pulmonary:      Effort: Pulmonary effort is normal. No respiratory distress.      Breath sounds: No wheezing (no gross audible wheeze through computer).   Musculoskeletal:      Cervical back: Normal range of motion.   Skin:     Findings: No rash (on face or neck).   Neurological:      Mental Status: He is alert.      Cranial Nerves: No dysarthria or facial asymmetry.   Psychiatric:         Mood and Affect: Mood normal.         Behavior: Behavior normal.         Visit Time  Total Visit Duration: 7 minutes not including the time spent for establishing the audio/video connection.

## 2025-04-05 NOTE — PATIENT INSTRUCTIONS
"As discussed, sinus infections are typically viral and will get better on their own in 7-10 days. You should try symptomatic relief in the meantime with OTC antihistamine (Claritin or Zyrtec), Afrin for up to 3 days then switch to Flonase, and Sudafed (behind the counter) and mucinex. You can also try sinus rinses with a neti pot or nasal lavage (be sure to use distilled water.) Sleep with a cool mist humidifier at your bedside. If your symptoms do not improve after 7-10 days, you may need antibiotics because it is more likely to be bacterial at that point.  Follow-up with your primary care physician for recheck in 2-3 business days. Go to the ER for sudden severe headache, high fevers, change in vision, seizure activity or anything else that is concerning.    Care Anywhere Phone number is [phone] if you need assistance or have further questions  note in \"Letters\" section of [employer]. Can print if opened from a web browser   "

## 2025-05-12 DIAGNOSIS — I10 ESSENTIAL HYPERTENSION: ICD-10-CM

## 2025-05-12 RX ORDER — VALSARTAN AND HYDROCHLOROTHIAZIDE 320; 25 MG/1; MG/1
1 TABLET, FILM COATED ORAL DAILY
Qty: 90 TABLET | Refills: 0 | Status: SHIPPED | OUTPATIENT
Start: 2025-05-12

## 2025-05-23 ENCOUNTER — APPOINTMENT (OUTPATIENT)
Dept: LAB | Facility: IMAGING CENTER | Age: 45
End: 2025-05-23
Payer: COMMERCIAL

## 2025-05-23 DIAGNOSIS — R73.03 PREDIABETES: ICD-10-CM

## 2025-05-23 DIAGNOSIS — I10 ESSENTIAL HYPERTENSION: ICD-10-CM

## 2025-05-23 DIAGNOSIS — E78.00 HYPERCHOLESTEROLEMIA: ICD-10-CM

## 2025-05-23 LAB
ALBUMIN SERPL BCG-MCNC: 4.6 G/DL (ref 3.5–5)
ALP SERPL-CCNC: 79 U/L (ref 34–104)
ALT SERPL W P-5'-P-CCNC: 33 U/L (ref 7–52)
ANION GAP SERPL CALCULATED.3IONS-SCNC: 8 MMOL/L (ref 4–13)
AST SERPL W P-5'-P-CCNC: 19 U/L (ref 13–39)
BILIRUB SERPL-MCNC: 0.4 MG/DL (ref 0.2–1)
BUN SERPL-MCNC: 12 MG/DL (ref 5–25)
CALCIUM SERPL-MCNC: 9.1 MG/DL (ref 8.4–10.2)
CHLORIDE SERPL-SCNC: 100 MMOL/L (ref 96–108)
CHOLEST SERPL-MCNC: 152 MG/DL (ref ?–200)
CO2 SERPL-SCNC: 29 MMOL/L (ref 21–32)
CREAT SERPL-MCNC: 0.73 MG/DL (ref 0.6–1.3)
EST. AVERAGE GLUCOSE BLD GHB EST-MCNC: 131 MG/DL
GFR SERPL CREATININE-BSD FRML MDRD: 112 ML/MIN/1.73SQ M
GLUCOSE P FAST SERPL-MCNC: 99 MG/DL (ref 65–99)
HBA1C MFR BLD: 6.2 %
HDLC SERPL-MCNC: 37 MG/DL
LDLC SERPL CALC-MCNC: 100 MG/DL (ref 0–100)
POTASSIUM SERPL-SCNC: 4.2 MMOL/L (ref 3.5–5.3)
PROT SERPL-MCNC: 7.5 G/DL (ref 6.4–8.4)
SODIUM SERPL-SCNC: 137 MMOL/L (ref 135–147)
TRIGL SERPL-MCNC: 75 MG/DL (ref ?–150)

## 2025-05-23 PROCEDURE — 80061 LIPID PANEL: CPT

## 2025-05-23 PROCEDURE — 83036 HEMOGLOBIN GLYCOSYLATED A1C: CPT

## 2025-05-23 PROCEDURE — 36415 COLL VENOUS BLD VENIPUNCTURE: CPT

## 2025-05-23 PROCEDURE — 80053 COMPREHEN METABOLIC PANEL: CPT

## 2025-05-27 ENCOUNTER — RESULTS FOLLOW-UP (OUTPATIENT)
Dept: INTERNAL MEDICINE CLINIC | Facility: OTHER | Age: 45
End: 2025-05-27

## 2025-05-29 ENCOUNTER — OFFICE VISIT (OUTPATIENT)
Dept: INTERNAL MEDICINE CLINIC | Facility: OTHER | Age: 45
End: 2025-05-29
Payer: COMMERCIAL

## 2025-05-29 VITALS
HEIGHT: 72 IN | WEIGHT: 281 LBS | TEMPERATURE: 98.6 F | OXYGEN SATURATION: 98 % | SYSTOLIC BLOOD PRESSURE: 126 MMHG | HEART RATE: 82 BPM | DIASTOLIC BLOOD PRESSURE: 82 MMHG | BODY MASS INDEX: 38.06 KG/M2

## 2025-05-29 DIAGNOSIS — Z13.0 SCREENING FOR DEFICIENCY ANEMIA: ICD-10-CM

## 2025-05-29 DIAGNOSIS — Z12.11 ENCOUNTER FOR COLORECTAL CANCER SCREENING: ICD-10-CM

## 2025-05-29 DIAGNOSIS — Z12.11 SCREENING FOR COLON CANCER: ICD-10-CM

## 2025-05-29 DIAGNOSIS — E78.00 HYPERCHOLESTEROLEMIA: ICD-10-CM

## 2025-05-29 DIAGNOSIS — E66.01 CLASS 2 SEVERE OBESITY DUE TO EXCESS CALORIES WITH SERIOUS COMORBIDITY AND BODY MASS INDEX (BMI) OF 38.0 TO 38.9 IN ADULT (HCC): ICD-10-CM

## 2025-05-29 DIAGNOSIS — I10 ESSENTIAL HYPERTENSION: Primary | ICD-10-CM

## 2025-05-29 DIAGNOSIS — Z12.12 ENCOUNTER FOR COLORECTAL CANCER SCREENING: ICD-10-CM

## 2025-05-29 DIAGNOSIS — E66.812 CLASS 2 SEVERE OBESITY DUE TO EXCESS CALORIES WITH SERIOUS COMORBIDITY AND BODY MASS INDEX (BMI) OF 38.0 TO 38.9 IN ADULT (HCC): ICD-10-CM

## 2025-05-29 DIAGNOSIS — R73.03 PREDIABETES: ICD-10-CM

## 2025-05-29 PROCEDURE — 99214 OFFICE O/P EST MOD 30 MIN: CPT | Performed by: NURSE PRACTITIONER

## 2025-05-29 RX ORDER — SIMVASTATIN 10 MG
10 TABLET ORAL
Qty: 90 TABLET | Refills: 1 | Status: SHIPPED | OUTPATIENT
Start: 2025-05-29

## 2025-05-29 NOTE — ASSESSMENT & PLAN NOTE
Lost 6 lbs in the last 6 months  Discussed optimizing his exercise regimen  Healthy diet  Continue to trend weight

## 2025-05-29 NOTE — ASSESSMENT & PLAN NOTE
Controlled on valsartan 320-HCTZ 25mg daily   Continue current regimen  Continue to work on healthy diet and routine exercise  Weight loss  Follow up in 6 months     Orders:    Comprehensive metabolic panel; Future

## 2025-05-29 NOTE — PROGRESS NOTES
Name: Yonas Domínguez      : 1980      MRN: [id number]  Encounter Provider: JO Bowie  Encounter Date: 2025   Encounter department: [facility]  :  Assessment & Plan  Encounter for colorectal cancer screening  Referral given for colonoscopy        Essential hypertension  Controlled on valsartan 320-HCTZ 25mg daily   Continue current regimen  Continue to work on healthy diet and routine exercise  Weight loss  Follow up in 6 months     Orders:    Comprehensive metabolic panel; Future    Hypercholesterolemia  ASCVD risk 2.1% on simvastatin 10mg daily   Continue current regimen  Continue to work on healthy diet and routine exercise  Weight loss  Follow up in 6 months   Orders:    Comprehensive metabolic panel; Future    Lipid Panel with Direct LDL reflex; Future    simvastatin (ZOCOR) 10 mg tablet; Take 1 tablet (10 mg total) by mouth daily at bedtime    Prediabetes  Hba1c 6.2   Discussed diet modifications (reducing breads, sugars, etc.) increase routine exercise  Repeat in 6 months   Orders:    Hemoglobin A1C; Future    Comprehensive metabolic panel; Future    Screening for colon cancer    Orders:    Ambulatory Referral to Gastroenterology; Future    Screening for deficiency anemia    Orders:    CBC and differential; Future    Class 2 severe obesity due to excess calories with serious comorbidity and body mass index (BMI) of 38.0 to 38.9 in adult (HCC)  Lost 6 lbs in the last 6 months  Discussed optimizing his exercise regimen  Healthy diet  Continue to trend weight               History of Present Illness   HPI    Patient presents today for 6 month follow up  Labs completed   CMP normal     HTN - currently on valsartan-HCTZ 320-25mg daily. He has not been monitoring his BP over the last month but otherwise it has been normal.     Prediabetes-  Hba1c 6.2 (6.1, 6.0).     HLD - total cholesterol 152, triglycerides 75, HDL 37, . Currently on  simvastatin 10mg daily   The 10-year ASCVD risk score (Maricruz CNAO, et al., 2019) is: 2.1%    Values used to calculate the score:      Age: 45 years      Clincally relevant sex: Male      Is Non- : No      Diabetic: No      Tobacco smoker: No      Systolic Blood Pressure: 126 mmHg      Is BP treated: Yes      HDL Cholesterol: 37 mg/dL      Total Cholesterol: 152 mg/dL      Review of Systems   Constitutional:  Negative for activity change, appetite change, chills, fever and unexpected weight change.   Eyes:  Negative for visual disturbance.   Respiratory:  Negative for cough, chest tightness, shortness of breath and wheezing.    Cardiovascular:  Negative for chest pain, palpitations and leg swelling.   Neurological:  Negative for headaches.       Objective   /82 (BP Location: Left arm, Patient Position: Sitting, Cuff Size: Large)   Pulse 82   Temp 98.6 °F (37 °C) (Temporal)   Ht 6' (1.829 m)   Wt 127 kg (281 lb)   SpO2 98%   BMI 38.11 kg/m²      Physical Exam  Vitals reviewed.   Constitutional:       General: He is not in acute distress.     Appearance: Normal appearance. He is obese. He is not diaphoretic.   HENT:      Head: Normocephalic and atraumatic.     Eyes:      Extraocular Movements: Extraocular movements intact.      Conjunctiva/sclera: Conjunctivae normal.      Pupils: Pupils are equal, round, and reactive to light.     Neck:      Vascular: No carotid bruit.     Cardiovascular:      Rate and Rhythm: Normal rate and regular rhythm.      Heart sounds: Normal heart sounds. No murmur heard.  Pulmonary:      Effort: Pulmonary effort is normal. No respiratory distress.      Breath sounds: Normal breath sounds. No wheezing, rhonchi or rales.     Neurological:      Mental Status: He is alert and oriented to person, place, and time. Mental status is at baseline.     Psychiatric:         Mood and Affect: Mood normal.         Behavior: Behavior normal.         Thought Content:  Thought content normal.         Judgment: Judgment normal.

## 2025-05-29 NOTE — ASSESSMENT & PLAN NOTE
Hba1c 6.2   Discussed diet modifications (reducing breads, sugars, etc.) increase routine exercise  Repeat in 6 months   Orders:    Hemoglobin A1C; Future    Comprehensive metabolic panel; Future

## 2025-05-29 NOTE — ASSESSMENT & PLAN NOTE
ASCVD risk 2.1% on simvastatin 10mg daily   Continue current regimen  Continue to work on healthy diet and routine exercise  Weight loss  Follow up in 6 months   Orders:    Comprehensive metabolic panel; Future    Lipid Panel with Direct LDL reflex; Future    simvastatin (ZOCOR) 10 mg tablet; Take 1 tablet (10 mg total) by mouth daily at bedtime